# Patient Record
Sex: FEMALE | Race: OTHER | Employment: OTHER | URBAN - METROPOLITAN AREA
[De-identification: names, ages, dates, MRNs, and addresses within clinical notes are randomized per-mention and may not be internally consistent; named-entity substitution may affect disease eponyms.]

---

## 2019-11-11 DIAGNOSIS — M81.0 POSTMENOPAUSAL OSTEOPOROSIS: ICD-10-CM

## 2019-12-11 ENCOUNTER — HOSPITAL ENCOUNTER (OUTPATIENT)
Dept: INFUSION THERAPY | Age: 69
Setting detail: INFUSION SERIES
Discharge: HOME | End: 2019-12-11
Payer: MEDICARE

## 2019-12-11 VITALS
DIASTOLIC BLOOD PRESSURE: 75 MMHG | SYSTOLIC BLOOD PRESSURE: 145 MMHG | HEART RATE: 74 BPM | HEIGHT: 63 IN | WEIGHT: 119 LBS | OXYGEN SATURATION: 98 % | RESPIRATION RATE: 16 BRPM | BODY MASS INDEX: 21.09 KG/M2 | TEMPERATURE: 98.1 F

## 2019-12-11 DIAGNOSIS — M81.0 POSTMENOPAUSAL OSTEOPOROSIS: Primary | ICD-10-CM

## 2019-12-11 PROCEDURE — 6360000002 HC RX W HCPCS: Performed by: INTERNAL MEDICINE

## 2019-12-11 PROCEDURE — 96372 THER/PROPH/DIAG INJ SC/IM: CPT

## 2019-12-11 RX ORDER — TRIAMCINOLONE ACETONIDE 1 MG/G
CREAM TOPICAL 2 TIMES DAILY
COMMUNITY

## 2019-12-11 RX ORDER — LOPERAMIDE HYDROCHLORIDE 2 MG/1
2 CAPSULE ORAL 4 TIMES DAILY PRN
COMMUNITY

## 2019-12-11 RX ORDER — MULTIVIT-MIN/IRON/FOLIC ACID/K 18-600-40
CAPSULE ORAL
COMMUNITY

## 2019-12-11 RX ORDER — LOSARTAN POTASSIUM AND HYDROCHLOROTHIAZIDE 12.5; 1 MG/1; MG/1
1 TABLET ORAL DAILY
COMMUNITY

## 2019-12-11 RX ORDER — LEVOTHYROXINE SODIUM 0.07 MG/1
75 TABLET ORAL DAILY
COMMUNITY

## 2019-12-11 RX ORDER — LEFLUNOMIDE 20 MG/1
20 TABLET ORAL DAILY
COMMUNITY

## 2019-12-11 RX ORDER — M-VIT,TX,IRON,MINS/CALC/FOLIC 27MG-0.4MG
1 TABLET ORAL DAILY
COMMUNITY

## 2019-12-11 RX ORDER — PHENOL 1.4 %
1 AEROSOL, SPRAY (ML) MUCOUS MEMBRANE DAILY
COMMUNITY

## 2019-12-11 RX ORDER — COVID-19 ANTIGEN TEST
KIT MISCELLANEOUS
COMMUNITY

## 2019-12-11 RX ORDER — METOPROLOL SUCCINATE 25 MG/1
25 TABLET, EXTENDED RELEASE ORAL DAILY
COMMUNITY

## 2019-12-11 RX ADMIN — DENOSUMAB 60 MG: 60 INJECTION SUBCUTANEOUS at 11:52

## 2020-06-09 ENCOUNTER — HOSPITAL ENCOUNTER (OUTPATIENT)
Dept: INFUSION THERAPY | Age: 70
Setting detail: INFUSION SERIES
Discharge: HOME | End: 2020-06-09
Payer: MEDICARE

## 2020-06-09 VITALS
DIASTOLIC BLOOD PRESSURE: 74 MMHG | HEART RATE: 76 BPM | TEMPERATURE: 98.2 F | OXYGEN SATURATION: 98 % | SYSTOLIC BLOOD PRESSURE: 145 MMHG | RESPIRATION RATE: 21 BRPM

## 2020-06-09 DIAGNOSIS — M81.0 POSTMENOPAUSAL OSTEOPOROSIS: Primary | ICD-10-CM

## 2020-06-09 PROCEDURE — 6360000002 HC RX W HCPCS: Performed by: INTERNAL MEDICINE

## 2020-06-09 PROCEDURE — 96372 THER/PROPH/DIAG INJ SC/IM: CPT

## 2020-06-09 RX ADMIN — DENOSUMAB 60 MG: 60 INJECTION SUBCUTANEOUS at 11:39

## 2020-11-06 ENCOUNTER — HOSPITAL ENCOUNTER (OUTPATIENT)
Age: 70
Discharge: HOME | End: 2020-11-06
Payer: MEDICARE

## 2020-11-06 LAB
ALBUMIN SERPL-MCNC: 3.7 G/DL (ref 3.5–5.2)
ALP BLD-CCNC: 103 U/L (ref 35–104)
ALT SERPL-CCNC: 20 U/L (ref 0–32)
AST SERPL-CCNC: 29 U/L (ref 0–31)
BASOPHILS ABSOLUTE: 0.05 E9/L (ref 0–0.2)
BASOPHILS RELATIVE PERCENT: 1.1 % (ref 0–2)
BILIRUB SERPL-MCNC: <0.2 MG/DL (ref 0–1.2)
BILIRUBIN DIRECT: <0.2 MG/DL (ref 0–0.3)
BILIRUBIN, INDIRECT: NORMAL MG/DL (ref 0–1)
BUN BLDV-MCNC: 28 MG/DL (ref 8–23)
C-REACTIVE PROTEIN: 0.2 MG/DL (ref 0–0.4)
CALCIUM SERPL-MCNC: 10.4 MG/DL (ref 8.6–10.2)
CREAT SERPL-MCNC: 0.9 MG/DL (ref 0.5–1)
EOSINOPHILS ABSOLUTE: 0.24 E9/L (ref 0.05–0.5)
EOSINOPHILS RELATIVE PERCENT: 5.5 % (ref 0–6)
FERRITIN: 190 NG/ML
GFR AFRICAN AMERICAN: >60
GFR NON-AFRICAN AMERICAN: >60 ML/MIN/1.73
HCT VFR BLD CALC: 34.2 % (ref 34–48)
HEMOGLOBIN: 10.4 G/DL (ref 11.5–15.5)
IMMATURE GRANULOCYTES #: 0.01 E9/L
IMMATURE GRANULOCYTES %: 0.2 % (ref 0–5)
LYMPHOCYTES ABSOLUTE: 0.94 E9/L (ref 1.5–4)
LYMPHOCYTES RELATIVE PERCENT: 21.6 % (ref 20–42)
MCH RBC QN AUTO: 27.7 PG (ref 26–35)
MCHC RBC AUTO-ENTMCNC: 30.4 % (ref 32–34.5)
MCV RBC AUTO: 91.2 FL (ref 80–99.9)
MONOCYTES ABSOLUTE: 0.61 E9/L (ref 0.1–0.95)
MONOCYTES RELATIVE PERCENT: 14 % (ref 2–12)
NEUTROPHILS ABSOLUTE: 2.5 E9/L (ref 1.8–7.3)
NEUTROPHILS RELATIVE PERCENT: 57.6 % (ref 43–80)
PDW BLD-RTO: 14 FL (ref 11.5–15)
PLATELET # BLD: 185 E9/L (ref 130–450)
PMV BLD AUTO: 11.1 FL (ref 7–12)
RBC # BLD: 3.75 E12/L (ref 3.5–5.5)
TOTAL PROTEIN: 7 G/DL (ref 6.4–8.3)
URIC ACID, SERUM: 3.5 MG/DL (ref 2.4–5.7)
VITAMIN D 25-HYDROXY: 98 NG/ML (ref 30–100)
WBC # BLD: 4.4 E9/L (ref 4.5–11.5)

## 2020-11-06 PROCEDURE — 86140 C-REACTIVE PROTEIN: CPT

## 2020-11-06 PROCEDURE — 80076 HEPATIC FUNCTION PANEL: CPT

## 2020-11-06 PROCEDURE — 84550 ASSAY OF BLOOD/URIC ACID: CPT

## 2020-11-06 PROCEDURE — 82728 ASSAY OF FERRITIN: CPT

## 2020-11-06 PROCEDURE — 84520 ASSAY OF UREA NITROGEN: CPT

## 2020-11-06 PROCEDURE — 82310 ASSAY OF CALCIUM: CPT

## 2020-11-06 PROCEDURE — 82565 ASSAY OF CREATININE: CPT

## 2020-11-06 PROCEDURE — 36415 COLL VENOUS BLD VENIPUNCTURE: CPT

## 2020-11-06 PROCEDURE — 82306 VITAMIN D 25 HYDROXY: CPT

## 2020-11-06 PROCEDURE — 82523 COLLAGEN CROSSLINKS: CPT

## 2020-11-06 PROCEDURE — 85025 COMPLETE CBC W/AUTO DIFF WBC: CPT

## 2020-11-10 LAB
Lab: NORMAL
REPORT: NORMAL
THIS TEST SENT TO: NORMAL

## 2020-12-30 ENCOUNTER — HOSPITAL ENCOUNTER (OUTPATIENT)
Dept: INFUSION THERAPY | Age: 70
Setting detail: INFUSION SERIES
Discharge: HOME | End: 2020-12-30
Payer: MEDICARE

## 2020-12-30 VITALS
DIASTOLIC BLOOD PRESSURE: 69 MMHG | SYSTOLIC BLOOD PRESSURE: 141 MMHG | HEART RATE: 78 BPM | RESPIRATION RATE: 18 BRPM | OXYGEN SATURATION: 99 % | TEMPERATURE: 97.9 F

## 2020-12-30 DIAGNOSIS — M81.0 POSTMENOPAUSAL OSTEOPOROSIS: Primary | ICD-10-CM

## 2020-12-30 PROCEDURE — 96372 THER/PROPH/DIAG INJ SC/IM: CPT

## 2020-12-30 PROCEDURE — 6360000002 HC RX W HCPCS: Performed by: INTERNAL MEDICINE

## 2020-12-30 RX ADMIN — DENOSUMAB 60 MG: 60 INJECTION SUBCUTANEOUS at 10:36

## 2021-06-30 ENCOUNTER — HOSPITAL ENCOUNTER (OUTPATIENT)
Age: 71
Setting detail: SPECIMEN
Discharge: HOME | End: 2021-06-30
Payer: MEDICARE

## 2021-06-30 LAB
ALBUMIN SERPL-MCNC: 3.9 G/DL (ref 3.5–5.2)
ALP BLD-CCNC: 108 U/L (ref 35–104)
ALT SERPL-CCNC: 25 U/L (ref 0–32)
AST SERPL-CCNC: 32 U/L (ref 0–31)
BASOPHILS ABSOLUTE: 0.04 E9/L (ref 0–0.2)
BASOPHILS RELATIVE PERCENT: 1 % (ref 0–2)
BILIRUB SERPL-MCNC: 0.3 MG/DL (ref 0–1.2)
BILIRUBIN DIRECT: <0.2 MG/DL (ref 0–0.3)
BILIRUBIN, INDIRECT: ABNORMAL MG/DL (ref 0–1)
BUN BLDV-MCNC: 25 MG/DL (ref 6–23)
C-REACTIVE PROTEIN: 0.3 MG/DL (ref 0–0.4)
CALCIUM SERPL-MCNC: 10 MG/DL (ref 8.6–10.2)
CREAT SERPL-MCNC: 1.1 MG/DL (ref 0.5–1)
EOSINOPHILS ABSOLUTE: 0.15 E9/L (ref 0.05–0.5)
EOSINOPHILS RELATIVE PERCENT: 3.6 % (ref 0–6)
GFR AFRICAN AMERICAN: 59
GFR NON-AFRICAN AMERICAN: 49 ML/MIN/1.73
HCT VFR BLD CALC: 34.3 % (ref 34–48)
HEMOGLOBIN: 10.6 G/DL (ref 11.5–15.5)
IMMATURE GRANULOCYTES #: 0 E9/L
IMMATURE GRANULOCYTES %: 0 % (ref 0–5)
LYMPHOCYTES ABSOLUTE: 0.74 E9/L (ref 1.5–4)
LYMPHOCYTES RELATIVE PERCENT: 17.7 % (ref 20–42)
MCH RBC QN AUTO: 28.4 PG (ref 26–35)
MCHC RBC AUTO-ENTMCNC: 30.9 % (ref 32–34.5)
MCV RBC AUTO: 92 FL (ref 80–99.9)
MONOCYTES ABSOLUTE: 0.58 E9/L (ref 0.1–0.95)
MONOCYTES RELATIVE PERCENT: 13.9 % (ref 2–12)
NEUTROPHILS ABSOLUTE: 2.66 E9/L (ref 1.8–7.3)
NEUTROPHILS RELATIVE PERCENT: 63.8 % (ref 43–80)
PDW BLD-RTO: 14.5 FL (ref 11.5–15)
PLATELET # BLD: 175 E9/L (ref 130–450)
PMV BLD AUTO: 11.5 FL (ref 7–12)
RBC # BLD: 3.73 E12/L (ref 3.5–5.5)
SEDIMENTATION RATE, ERYTHROCYTE: 15 MM/HR (ref 0–20)
TOTAL PROTEIN: 6.8 G/DL (ref 6.4–8.3)
URIC ACID, SERUM: 3.7 MG/DL (ref 2.4–5.7)
VITAMIN D 25-HYDROXY: 75 NG/ML (ref 30–100)
WBC # BLD: 4.2 E9/L (ref 4.5–11.5)

## 2021-06-30 PROCEDURE — 85651 RBC SED RATE NONAUTOMATED: CPT

## 2021-06-30 PROCEDURE — 82306 VITAMIN D 25 HYDROXY: CPT

## 2021-06-30 PROCEDURE — 83937 ASSAY OF OSTEOCALCIN: CPT

## 2021-06-30 PROCEDURE — 84075 ASSAY ALKALINE PHOSPHATASE: CPT

## 2021-06-30 PROCEDURE — 36415 COLL VENOUS BLD VENIPUNCTURE: CPT

## 2021-06-30 PROCEDURE — 84550 ASSAY OF BLOOD/URIC ACID: CPT

## 2021-06-30 PROCEDURE — 85025 COMPLETE CBC W/AUTO DIFF WBC: CPT

## 2021-06-30 PROCEDURE — 82310 ASSAY OF CALCIUM: CPT

## 2021-06-30 PROCEDURE — 80076 HEPATIC FUNCTION PANEL: CPT

## 2021-06-30 PROCEDURE — 84520 ASSAY OF UREA NITROGEN: CPT

## 2021-06-30 PROCEDURE — 82523 COLLAGEN CROSSLINKS: CPT

## 2021-06-30 PROCEDURE — 82565 ASSAY OF CREATININE: CPT

## 2021-06-30 PROCEDURE — 86140 C-REACTIVE PROTEIN: CPT

## 2021-06-30 PROCEDURE — 84080 ASSAY ALKALINE PHOSPHATASES: CPT

## 2021-07-03 LAB
CREATININE URINE: 116 MG/DL
N-TELOPEPTIDE, CROSS-LINKED, URINE: 14

## 2021-07-04 LAB
ALK PHOS OTHER CALC: 0 U/L
ALK PHOSPHATASE: 106 U/L (ref 40–120)
ALKALINE PHOSPHATASE BONE FRACTION: 34 U/L (ref 0–55)
ALKALINE PHOSPHATASE LIVER FRACTION: 72 U/L (ref 0–94)
MISCELLANEOUS LAB TEST RESULT: NORMAL

## 2021-07-14 ENCOUNTER — HOSPITAL ENCOUNTER (OUTPATIENT)
Dept: INFUSION THERAPY | Age: 71
Setting detail: INFUSION SERIES
Discharge: HOME | End: 2021-07-14
Payer: MEDICARE

## 2021-07-14 VITALS
DIASTOLIC BLOOD PRESSURE: 81 MMHG | HEART RATE: 75 BPM | OXYGEN SATURATION: 98 % | SYSTOLIC BLOOD PRESSURE: 126 MMHG | TEMPERATURE: 97.7 F | RESPIRATION RATE: 18 BRPM

## 2021-07-14 DIAGNOSIS — M81.0 POSTMENOPAUSAL OSTEOPOROSIS: Primary | ICD-10-CM

## 2021-07-14 PROCEDURE — 96372 THER/PROPH/DIAG INJ SC/IM: CPT

## 2021-07-14 PROCEDURE — 6360000002 HC RX W HCPCS: Performed by: INTERNAL MEDICINE

## 2021-07-14 RX ADMIN — DENOSUMAB 60 MG: 60 INJECTION SUBCUTANEOUS at 10:34

## 2022-01-12 ENCOUNTER — HOSPITAL ENCOUNTER (OUTPATIENT)
Dept: INFUSION THERAPY | Age: 72
Setting detail: INFUSION SERIES
Discharge: HOME | End: 2022-01-12
Payer: MEDICARE

## 2022-01-12 VITALS
HEART RATE: 74 BPM | SYSTOLIC BLOOD PRESSURE: 116 MMHG | RESPIRATION RATE: 18 BRPM | TEMPERATURE: 97.9 F | OXYGEN SATURATION: 98 % | DIASTOLIC BLOOD PRESSURE: 64 MMHG

## 2022-01-12 DIAGNOSIS — M81.0 POSTMENOPAUSAL OSTEOPOROSIS: Primary | ICD-10-CM

## 2022-01-12 PROCEDURE — 6360000002 HC RX W HCPCS: Performed by: INTERNAL MEDICINE

## 2022-01-12 PROCEDURE — 96372 THER/PROPH/DIAG INJ SC/IM: CPT

## 2022-01-12 RX ADMIN — DENOSUMAB 60 MG: 60 INJECTION SUBCUTANEOUS at 10:34

## 2022-07-13 ENCOUNTER — HOSPITAL ENCOUNTER (OUTPATIENT)
Dept: INFUSION THERAPY | Age: 72
Setting detail: INFUSION SERIES
Discharge: HOME | End: 2022-07-13
Payer: MEDICARE

## 2022-07-13 VITALS
TEMPERATURE: 97.2 F | HEART RATE: 59 BPM | SYSTOLIC BLOOD PRESSURE: 147 MMHG | RESPIRATION RATE: 20 BRPM | OXYGEN SATURATION: 99 % | DIASTOLIC BLOOD PRESSURE: 65 MMHG

## 2022-07-13 DIAGNOSIS — M81.0 POSTMENOPAUSAL OSTEOPOROSIS: Primary | ICD-10-CM

## 2022-07-13 PROCEDURE — 96372 THER/PROPH/DIAG INJ SC/IM: CPT

## 2022-07-13 PROCEDURE — 6360000002 HC RX W HCPCS: Performed by: INTERNAL MEDICINE

## 2022-07-13 RX ADMIN — DENOSUMAB 60 MG: 60 INJECTION SUBCUTANEOUS at 10:08

## 2022-12-13 DIAGNOSIS — M81.0 POSTMENOPAUSAL OSTEOPOROSIS: Primary | ICD-10-CM

## 2023-05-11 LAB
ANION GAP SERPL CALCULATED.3IONS-SCNC: 8 MMOL/L (ref 7–16)
BUN SERPL-MCNC: 23 MG/DL (ref 6–23)
CALCIUM SERPL-MCNC: 10 MG/DL (ref 8.6–10.2)
CHLORIDE SERPL-SCNC: 102 MMOL/L (ref 98–107)
CO2 SERPL-SCNC: 29 MMOL/L (ref 22–29)
CREAT SERPL-MCNC: 0.9 MG/DL (ref 0.5–1)
GLUCOSE SERPL-MCNC: 102 MG/DL (ref 74–99)
MAGNESIUM SERPL-MCNC: 2 MG/DL (ref 1.6–2.6)
POTASSIUM SERPL-SCNC: 4.6 MMOL/L (ref 3.5–5)
SODIUM SERPL-SCNC: 139 MMOL/L (ref 132–146)

## 2023-08-01 LAB
ANION GAP SERPL CALCULATED.3IONS-SCNC: 11 MMOL/L (ref 7–16)
BUN SERPL-MCNC: 24 MG/DL (ref 6–23)
CALCIUM SERPL-MCNC: 10.2 MG/DL (ref 8.6–10.2)
CHLORIDE SERPL-SCNC: 102 MMOL/L (ref 98–107)
CO2 SERPL-SCNC: 27 MMOL/L (ref 22–29)
CREAT SERPL-MCNC: 1.1 MG/DL (ref 0.5–1)
GFR SERPL CREATININE-BSD FRML MDRD: 52 ML/MIN/1.73M2
GLUCOSE SERPL-MCNC: 86 MG/DL (ref 74–99)
MAGNESIUM SERPL-MCNC: 2 MG/DL (ref 1.6–2.6)
POTASSIUM SERPL-SCNC: 5 MMOL/L (ref 3.5–5)
SODIUM SERPL-SCNC: 140 MMOL/L (ref 132–146)

## 2023-12-07 DIAGNOSIS — M81.0 POSTMENOPAUSAL OSTEOPOROSIS: Primary | ICD-10-CM

## 2024-01-11 ENCOUNTER — HOSPITAL ENCOUNTER (OUTPATIENT)
Dept: INFUSION THERAPY | Age: 74
Setting detail: INFUSION SERIES
Discharge: HOME | End: 2024-01-11
Payer: MEDICARE

## 2024-01-11 VITALS
HEART RATE: 67 BPM | OXYGEN SATURATION: 97 % | SYSTOLIC BLOOD PRESSURE: 158 MMHG | TEMPERATURE: 97 F | RESPIRATION RATE: 18 BRPM | DIASTOLIC BLOOD PRESSURE: 68 MMHG

## 2024-01-11 DIAGNOSIS — M81.0 POSTMENOPAUSAL OSTEOPOROSIS: Primary | ICD-10-CM

## 2024-01-11 PROCEDURE — 6360000002 HC RX W HCPCS: Performed by: INTERNAL MEDICINE

## 2024-01-11 PROCEDURE — 96372 THER/PROPH/DIAG INJ SC/IM: CPT

## 2024-01-11 RX ADMIN — DENOSUMAB 60 MG: 60 INJECTION SUBCUTANEOUS at 09:56

## 2024-05-02 ENCOUNTER — EVALUATION (OUTPATIENT)
Dept: PHYSICAL THERAPY | Facility: HOSPITAL | Age: 74
End: 2024-05-02
Payer: MEDICARE

## 2024-05-02 DIAGNOSIS — M41.56 OTHER SECONDARY SCOLIOSIS, LUMBAR REGION: Primary | ICD-10-CM

## 2024-05-02 DIAGNOSIS — M54.50 SPINE PAIN, LUMBOSACRAL: ICD-10-CM

## 2024-05-02 PROCEDURE — 97162 PT EVAL MOD COMPLEX 30 MIN: CPT | Mod: GP

## 2024-05-02 SDOH — ECONOMIC STABILITY: FOOD INSECURITY: WITHIN THE PAST 12 MONTHS, YOU WORRIED THAT YOUR FOOD WOULD RUN OUT BEFORE YOU GOT MONEY TO BUY MORE.: NEVER TRUE

## 2024-05-02 SDOH — ECONOMIC STABILITY: FOOD INSECURITY: WITHIN THE PAST 12 MONTHS, THE FOOD YOU BOUGHT JUST DIDN'T LAST AND YOU DIDN'T HAVE MONEY TO GET MORE.: NEVER TRUE

## 2024-05-02 ASSESSMENT — PAIN - FUNCTIONAL ASSESSMENT: PAIN_FUNCTIONAL_ASSESSMENT: 0-10

## 2024-05-02 ASSESSMENT — PAIN DESCRIPTION - DESCRIPTORS: DESCRIPTORS: ACHING;SHARP;SHOOTING;NUMBNESS

## 2024-05-02 ASSESSMENT — ENCOUNTER SYMPTOMS
LOSS OF SENSATION IN FEET: 0
DEPRESSION: 0

## 2024-05-02 ASSESSMENT — PATIENT HEALTH QUESTIONNAIRE - PHQ9
SUM OF ALL RESPONSES TO PHQ9 QUESTIONS 1 AND 2: 0
2. FEELING DOWN, DEPRESSED OR HOPELESS: NOT AT ALL
1. LITTLE INTEREST OR PLEASURE IN DOING THINGS: NOT AT ALL

## 2024-05-02 ASSESSMENT — PAIN SCALES - GENERAL: PAINLEVEL_OUTOF10: 0 - NO PAIN

## 2024-05-02 NOTE — PROGRESS NOTES
Physical Therapy Evaluation    Patient Name: Alexa Jerry  MRN: [id number]  : 1950   Today's Date: 2024    Time Calculation  Start Time: 1119  Stop Time: 1205  Time Calculation (min): 46 min  PT Evaluation Time Entry  PT Evaluation (Moderate) Time Entry: 46            Current Problem  1. Other secondary scoliosis, lumbar region  Referral to Physical Therapy    Follow Up In Physical Therapy      2. Spine pain, lumbosacral  Follow Up In Physical Therapy          Subjective   General:  General  Reason for Referral: Scoliosis- L-S pain  Referred By: Dr Ragsdale  Past Medical History Relevant to Rehab: Afib, dementia, ELLY,  Preferred Learning Style: kinesthetic, visual, writtenPt fell about 2-3 months when stepping out of the shower, her right side hit the side of the tub, fractured 3 ribs, hospitalized about 4 days,  has pain with movements. When she sits for prolonged period feels numbness int buttocks.    Precautions:    Precautions  STEADI Fall Risk Score (The score of 4 or more indicates an increased risk of falling): 7    Pain:  Pain Assessment: 0-10  Pain Score: 0 - No pain  Pain Type: Chronic pain  Pain Location: Back  Pain Orientation: Lower  Pain Descriptors: Aching, Sharp, Shooting, Numbness  Pain Frequency: IntermittentCurrent: 0/10   Worst: 8/10 gets sharp pains with certain movements        Prior Function Per Pt/Caregiver Report:   Independent with ADL's and ambulation without Assistive Device, didn't sit still, constantly cleaning and moving    Objective      CT THORACIC SPINE: 2023     PARASPINAL SOFT TISSUES: No paravertebral fluid collection or  significant edema.  ALIGNMENT: No traumatic spondylolisthesis or traumatic facet  widening.  VERTEBRAE: No acute fracture. Vertebral body heights are maintained.  SPINAL CANAL/INTERVERTEBRAL DISCS: There are multilevel disc spur  complexes at T3-T4, T10-T11, T11-T12, T12-L1. At T3-T4 this resultant  effacement of ventral thecal sac and mild  canal stenosis. At T10-T11,  this is left paracentral resulting in mild lateral recess stenosis.  At T11-T12, this is diffuse, resulting in mild canal stenosis.  T12-L1, there is a large right paracentral disc spur complex with  inferior migration of a calcified disc fragment causing right lateral  recess stenosis and superimposed upon broad-based disc spur complex  with mild canal stenosis. There is severe disc height loss at  T11-T12, T12-L1.        CT LUMBAR SPINE:     PARASPINAL SOFT TISSUES: No paravertebral fluid collection or  significant edema.  ALIGNMENT: There is degenerative grade 1 retrolisthesis of L2 on L3.  there is a degenerative levoconvex scoliosis centered at L3-L4, L4-5  due to asymmetric disc height loss on the right. No traumatic  spondylolisthesis or traumatic facet widening.  VERTEBRAE: No acute fracture. Vertebral body heights are maintained.  SPINAL CANAL/INTERVERTEBRAL DISCS/NEURAL FORAMINA: There is severe  L1-L2 disc height loss. There is a large calcified central right  paracentral disc spur complex at L1-L2 causing mild-moderate central  canal stenosis and high-grade right lateral recess stenosis, likely  abutting the intraforaminal/proximal extraforaminal right L1 nerve  root. There is otherwise mild degenerative disc disease without  significant canal stenosis or high-grade foraminal stenosis. There  mild right L3-L4, L4-5 foraminal stenosis.     IMPRESSION:  CT CHEST/ABDOMEN/PELVIS:  1. Acute fractures of the posterior right 9th-11th ribs in 2  locations (costovertebral joint and posterolaterally) which meet  criteria for flail chest. There is also acute nondisplaced fracture  of the posterior right 12th rib.  2. No acute traumatic injury in the chest, abdomen, or pelvis.  3. Trace right pleural effusion and mild bibasilar atelectasis.  4. Mildly inflamed appearance of the distal transverse through the  rectosigmoid colon as described above. Please correlate for symptoms  of  colitis.  5. 0.7 cm incompletely characterized hyperdense right renal mass that  could represent an enhancing tumor or hemorrhagic cyst. Recommend  urologic follow-up for appropriate management which may include  multiphasic MRI of the kidneys or repeat cross-sectional imaging in 1  year to re-evaluate..  Posture:   Scoliosis-right hip lower    Range of Motion:     Trunk 5/2    Flexion 25    Extension 10    SB 12 5          Strength:   Measured with seated MMT'ing, WNL's with exception of those listed below    Right Left   Hip:     Flexion 3+/5 P! 3+/5 P! On right   Abduction 4+/5 P! 4+/5      Flexibility:   Hamstring (SLR):  Right =  62      Left =68     Degrees     Palpation:   TTP over right Si and piriformis    Special Tests:   SLR: Neg EBONY    Gait:   Pt ambulates independently without Assistive Device, narrow    Outcome Measures:    Oswestry Disablity Index (JABIER): 32%     Treatment:                            Date: 5/2                           VISIT# #1 #   EXERCISES REPS REPS        NuStep          Hamstring Str          DKTC     LTR                              HEP        OP EDUCATION:  Outpatient Education  Individual(s) Educated: Patient  Education Provided: POC, Fall Risk, Physiology  Risk and Benefits Discussed with Patient/Caregiver/Other: yes  Patient/Caregiver Demonstrated Understanding: yes  Plan of Care Discussed and Agreed Upon: yes  Patient Response to Education: Patient/Caregiver Verbalized Understanding of Information, Patient/Caregiver Asked Appropriate Questions  Assessment   PT Assessment  PT Assessment Results: Decreased strength, Decreased range of motion, Decreased mobility, Pain  Rehab Prognosis: Fair      Plan  Treatment/Interventions: Education/ Instruction, Electrical stimulation, Cryotherapy, Manual therapy, Neuromuscular re-education, Therapeutic activities, Therapeutic exercises, Ultrasound  PT Plan: Skilled PT  PT Frequency: 2 times per week  Duration: 6-8 wks  Rehab Potential:  Good  Plan of Care Agreement: Patient    Goals:  Active       L-S pain       1. Pt will demonstrate independence with HEP to reinforce gains made in treatment  (Met)       Start:  05/02/24    Expected End:  05/29/24    Resolved:  05/29/24         2. Pt will report having a decrease in pain to 6/10 at its worst for increased tolerance for performing ADL's  (Progressing)       Start:  05/02/24    Expected End:  06/07/24            3. Pt will have an increase in core and LE strength by 1/3 MMT grade for decreased difficulty with mobility       Start:  05/02/24    Expected End:  06/27/24            4. Pt will have a decrease in Oswestry Disability score to 28% to indicate an increase in functional ability       Start:  05/02/24    Expected End:  06/27/24            5. Pt will demonstrate having a good understanding an performance of transfers and positioning to reduce back pain/strain       Start:  05/02/24    Expected End:  06/07/24

## 2024-05-02 NOTE — Clinical Note
June 2, 2024    Walt Ragsdale MD  [address] [facility] [place] [address]    Patient: Alexa Jerry   YOB: 1950   Date of Visit: 5/2/2024       Dear Walt Ragsdale MD  [address] [place] [address]    The attached plan of care is being sent to you because your patient’s medical reimbursement requires that you certify the plan of care. Your signature is required to allow uninterrupted insurance coverage.      You may indicate your approval by signing below and faxing this form back to us at Dept Fax: [phone].    Please call Dept: [phone] with any questions or concerns.    Thank you for this referral,        Cathi Franks, PT  [address] [address] [phone]    Payer: Payor: [employer] MEDICARE / Plan: [employer] MEDICARE / Product Type: *No Product type* /                                                                         Date:     Dear Cathi Franks, PT,     Re: Ms. Aelxa Jerry, MRN:[id number]    I certify that I have reviewed the attached plan of care and it is medically necessary for Ms. Alexa Jerry (1950) who is under my care.          ______________________________________                    _________________  Provider name and credentials                                           Date and time                                                                                           Plan of Care 5/2/24   Effective from: 5/2/2024  Effective to: 8/1/2024    Plan ID: [id number]            Participants as of Finalize on 6/2/2024    Name Type Comments Contact Info    Walt Ragsdale MD Referring Provider  [phone]    Cathi Franks, PT Physical Therapist  [phone]       Last Plan Note     Author: Cathi Franks PT Status: Signed Last edited:  2024  9:00 AM         Physical Therapy Evaluation    Patient Name: Alexa Jerry  MRN: [id number]  : 1950   Today's Date: 2024    Time Calculation  Start Time: 0903 (In bathroom)  Stop Time: 0945  Time Calculation (min): 42 min     PT Therapeutic Procedures Time Entry  Manual Therapy Time Entry: 5  Therapeutic Exercise Time Entry: 24  Therapeutic Activity Time Entry: 3  PT Modalities Time Entry  Ultrasound Time Entry: 10      Current Problem  1. Other secondary scoliosis, lumbar region  Follow Up In Physical Therapy      2. Spine pain, lumbosacral  Follow Up In Physical Therapy          Subjective   General:   Pt fell about 2-3 months when stepping out of the shower, her right side hit the side of the tub, fractured 3 ribs, hospitalized about 4 days,  has pain with movements. When she sits for prolonged period feels numbness int buttocks.    Precautions:    Precautions  STEADI Fall Risk Score (The score of 4 or more indicates an increased risk of falling): 7    Pain:  Pain Assessment: 0-10  Pain Score: 0 - No pain  Pain Type: Chronic pain  Pain Location: Back  Pain Orientation: LowerCurrent: 0/10   Worst: 8/10 gets sharp pains with certain movements        Prior Function Per Pt/Caregiver Report:   Independent with ADL's and ambulation without Assistive Device, didn't sit still, constantly cleaning and moving    Objective      CT THORACIC SPINE: 2023     PARASPINAL SOFT TISSUES: No paravertebral fluid collection or  significant edema.  ALIGNMENT: No traumatic spondylolisthesis or traumatic facet  widening.  VERTEBRAE: No acute fracture. Vertebral body heights are maintained.  SPINAL CANAL/INTERVERTEBRAL DISCS: There are multilevel disc spur  complexes at T3-T4, T10-T11, T11-T12, T12-L1. At T3-T4 this resultant  effacement of ventral thecal sac and mild canal stenosis. At T10-T11,  this is left paracentral resulting in mild lateral recess stenosis.  At T11-T12, this is diffuse, resulting in mild  canal stenosis.  T12-L1, there is a large right paracentral disc spur complex with  inferior migration of a calcified disc fragment causing right lateral  recess stenosis and superimposed upon broad-based disc spur complex  with mild canal stenosis. There is severe disc height loss at  T11-T12, T12-L1.        CT LUMBAR SPINE:     PARASPINAL SOFT TISSUES: No paravertebral fluid collection or  significant edema.  ALIGNMENT: There is degenerative grade 1 retrolisthesis of L2 on L3.  there is a degenerative levoconvex scoliosis centered at L3-L4, L4-5  due to asymmetric disc height loss on the right. No traumatic  spondylolisthesis or traumatic facet widening.  VERTEBRAE: No acute fracture. Vertebral body heights are maintained.  SPINAL CANAL/INTERVERTEBRAL DISCS/NEURAL FORAMINA: There is severe  L1-L2 disc height loss. There is a large calcified central right  paracentral disc spur complex at L1-L2 causing mild-moderate central  canal stenosis and high-grade right lateral recess stenosis, likely  abutting the intraforaminal/proximal extraforaminal right L1 nerve  root. There is otherwise mild degenerative disc disease without  significant canal stenosis or high-grade foraminal stenosis. There  mild right L3-L4, L4-5 foraminal stenosis.     IMPRESSION:  CT CHEST/ABDOMEN/PELVIS:  1. Acute fractures of the posterior right 9th-11th ribs in 2  locations (costovertebral joint and posterolaterally) which meet  criteria for flail chest. There is also acute nondisplaced fracture  of the posterior right 12th rib.  2. No acute traumatic injury in the chest, abdomen, or pelvis.  3. Trace right pleural effusion and mild bibasilar atelectasis.  4. Mildly inflamed appearance of the distal transverse through the  rectosigmoid colon as described above. Please correlate for symptoms  of colitis.  5. 0.7 cm incompletely characterized hyperdense right renal mass that  could represent an enhancing tumor or hemorrhagic cyst.  Recommend  urologic follow-up for appropriate management which may include  multiphasic MRI of the kidneys or repeat cross-sectional imaging in 1  year to re-evaluate..  Posture:   Scoliosis-right hip lower    Range of Motion:     Trunk 5/2    Flexion 25    Extension 10    SB 12 5          Strength:   Measured with seated MMT'ing, WNL's with exception of those listed below    Right Left   Hip:     Flexion 3+/5 P! 3+/5 P! On right   Abduction 4+/5 P! 4+/5      Flexibility:   Hamstring (SLR):  Right =  62      Left =68     Degrees     Palpation:   TTP over right Si and piriformis    Special Tests:   SLR: Neg EBONY    Gait:   Pt ambulates independently without Assistive Device, narrow    Outcome Measures:          Treatment:                            Date: 5/2                           VISIT# #1 #   EXERCISES REPS REPS        NuStep          Hamstring Str          DKTC     LTR                              HEP        OP EDUCATION:     Assessment          Plan       Goals:  Active       L-S pain       1. Pt will demonstrate independence with HEP to reinforce gains made in treatment  (Met)       Start:  05/02/24    Expected End:  05/29/24    Resolved:  05/29/24         2. Pt will report having a decrease in pain to 6/10 at its worst for increased tolerance for performing ADL's  (Progressing)       Start:  05/02/24    Expected End:  06/07/24            3. Pt will have an increase in core and LE strength by 1/3 MMT grade for decreased difficulty with mobility       Start:  05/02/24    Expected End:  06/27/24            4. Pt will have a decrease in Oswestry Disability score to 28% to indicate an increase in functional ability       Start:  05/02/24    Expected End:  06/27/24            5. Pt will demonstrate having a good understanding an performance of transfers and positioning to reduce back pain/strain       Start:  05/02/24    Expected End:  06/07/24              Physical Therapy    Physical Therapy Treatment    Patient  "Name: Alexa Jerry  MRN: [id number]  : 1950   Today's Date: 2024  Time Calculation  Start Time: 903 (In bathroom)  Stop Time: 945  Time Calculation (min): 42 min  PT Therapeutic Procedures Time Entry  Manual Therapy Time Entry: 5  Therapeutic Exercise Time Entry: 24  Therapeutic Activity Time Entry: 3  PT Modalities Time Entry  Ultrasound Time Entry: 10  Visit # 7  Insurance:          Current Problem  1. Other secondary scoliosis, lumbar region  Follow Up In Physical Therapy      2. Spine pain, lumbosacral  Follow Up In Physical Therapy            Subjective   Pt reports no pain upon arrival, continues to be worst in a.m and with certain movements       Precautions  Precautions  STEADI Fall Risk Score (The score of 4 or more indicates an increased risk of falling): 7       Pain  Pain Assessment: 0-10  Pain Score: 0 - No pain  Pain Type: Chronic pain  Pain Location: Back  Pain Orientation: Lower    Objective            Treatments:                            Date:                           VISIT# 6 7   EXERCISES          NuStep L1 x13' L1 x 10'        RB Calf Str 3 x 30\"    Hip Flexor Str 3 x 30\" ea 3 x30   T- Band: with core stabilization      Mid Row 2x 10 x rtb     Pull Down 2 x15 x ytb     Chop  2 x 10 x ytb        DKTC on SB 2 x10 2 x 10    LTR on SB  2 x 10     Lateral  2 laps    Hip abduction     Ball squeeze          Seated Trunk flex on SB          US 1.0 MHz 1.5 W/cm2 x 8' to right SI- piriformis region 1.0 MHz 1.5 W/cm2 x 8' to right SI- piriformis region   IFC w/ MH     Manual:  -distraction   HEP       Assessment:         Plan:       OP EDUCATION:       Goals:  Active       L-S pain       1. Pt will demonstrate independence with HEP to reinforce gains made in treatment  (Met)       Start:  24    Expected End:  24    Resolved:  24         2. Pt will report having a decrease in pain to 6/10 at its worst for increased tolerance for performing ADL's  (Progressing)  "      Start:  05/02/24    Expected End:  06/07/24            3. Pt will have an increase in core and LE strength by 1/3 MMT grade for decreased difficulty with mobility       Start:  05/02/24    Expected End:  06/27/24            4. Pt will have a decrease in Oswestry Disability score to 28% to indicate an increase in functional ability       Start:  05/02/24    Expected End:  06/27/24            5. Pt will demonstrate having a good understanding an performance of transfers and positioning to reduce back pain/strain       Start:  05/02/24    Expected End:  06/07/24                 .         Current Participants as of 6/2/2024    Name Type Comments Contact Info    Walt Ragsdale MD Referring Provider  [phone]    Signature pending    Cathi Franks PT Physical Therapist  [phone]

## 2024-05-15 ENCOUNTER — TREATMENT (OUTPATIENT)
Dept: PHYSICAL THERAPY | Facility: HOSPITAL | Age: 74
End: 2024-05-15
Payer: MEDICARE

## 2024-05-15 DIAGNOSIS — M41.56 OTHER SECONDARY SCOLIOSIS, LUMBAR REGION: ICD-10-CM

## 2024-05-15 DIAGNOSIS — M54.50 SPINE PAIN, LUMBOSACRAL: ICD-10-CM

## 2024-05-15 PROCEDURE — 97110 THERAPEUTIC EXERCISES: CPT | Mod: GP

## 2024-05-15 ASSESSMENT — PAIN SCALES - GENERAL: PAINLEVEL_OUTOF10: 0 - NO PAIN

## 2024-05-15 ASSESSMENT — PAIN - FUNCTIONAL ASSESSMENT: PAIN_FUNCTIONAL_ASSESSMENT: 0-10

## 2024-05-15 NOTE — PROGRESS NOTES
Physical Therapy    Physical Therapy Treatment    Patient Name: Alexa Jerry  MRN: [id number]  : 1950   Today's Date: 5/15/2024  Time Calculation  Start Time: 953 (Therapist error)  Stop Time: 0  Time Calculation (min): 47 min  PT Therapeutic Procedures Time Entry  Therapeutic Exercise Time Entry: 43     Visit # 2  Insurance:          Current Problem  1. Other secondary scoliosis, lumbar region  Follow Up In Physical Therapy      2. Spine pain, lumbosacral  Follow Up In Physical Therapy            Subjective   Pt reports no new complaints, pain continues to be with rotational movements, worse with getting out of bed in the a.m       Precautions  Precautions  STEADI Fall Risk Score (The score of 4 or more indicates an increased risk of falling): 7       Pain  Pain Assessment: 0-10  Pain Score: 0 - No pain  Pain Type: Chronic pain  Pain Location: Back  Pain Orientation: Lower  Pain Frequency: Intermittent    Objective    Additional objective measures added to Initial evaluation      Treatments:                            Date: 5/2 5/15                          VISIT# #1 #2   EXERCISES REPS REPS     - continuation of LB assesssment   NuStep  L1 x 10'        T- Band      Mid Row      Pull Down      Chop          DKTC on SB  2 x10   LTR on SB  2 x10         Hip abduction  3  x 10 x ytb   Ball squeeze  X 10         HEP       Assessment:  Pt tolerated treatment without complaint of residual increase in symptoms, beginning to  progress with POC        Plan:   Progress with pain management and core strengthening and mobility as tolerated    OP EDUCATION:   Print out for HEP issued with written instruction and pictures for :  LTR  SKTC  Hip abd  Ball Squeeze  Goals:  Active       L-S pain       1. Pt will demonstrate independence with HEP to reinforce gains made in treatment  (Met)       Start:  24    Expected End:  24    Resolved:  24         2. Pt will report having a decrease in pain to 6/10  at its worst for increased tolerance for performing ADL's  (Progressing)       Start:  05/02/24    Expected End:  06/07/24            3. Pt will have an increase in core and LE strength by 1/3 MMT grade for decreased difficulty with mobility       Start:  05/02/24    Expected End:  06/27/24            4. Pt will have a decrease in Oswestry Disability score to 28% to indicate an increase in functional ability       Start:  05/02/24    Expected End:  06/27/24            5. Pt will demonstrate having a good understanding an performance of transfers and positioning to reduce back pain/strain       Start:  05/02/24    Expected End:  06/07/24                 .

## 2024-05-17 ENCOUNTER — TREATMENT (OUTPATIENT)
Dept: PHYSICAL THERAPY | Facility: HOSPITAL | Age: 74
End: 2024-05-17
Payer: MEDICARE

## 2024-05-17 DIAGNOSIS — M41.56 OTHER SECONDARY SCOLIOSIS, LUMBAR REGION: ICD-10-CM

## 2024-05-17 DIAGNOSIS — M54.50 SPINE PAIN, LUMBOSACRAL: ICD-10-CM

## 2024-05-17 PROCEDURE — 97110 THERAPEUTIC EXERCISES: CPT | Mod: GP

## 2024-05-17 PROCEDURE — 97014 ELECTRIC STIMULATION THERAPY: CPT | Mod: GP

## 2024-05-17 PROCEDURE — 97140 MANUAL THERAPY 1/> REGIONS: CPT | Mod: GP

## 2024-05-17 ASSESSMENT — PAIN SCALES - GENERAL: PAINLEVEL_OUTOF10: 0 - NO PAIN

## 2024-05-17 ASSESSMENT — PAIN - FUNCTIONAL ASSESSMENT: PAIN_FUNCTIONAL_ASSESSMENT: 0-10

## 2024-05-17 NOTE — PROGRESS NOTES
Physical Therapy    Physical Therapy Treatment    Patient Name: Alexa Jerry  MRN: [id number]  : 1950   Today's Date: 2024  Time Calculation  Start Time: 1021  Stop Time: 1110  Time Calculation (min): 49 min  PT Therapeutic Procedures Time Entry  Manual Therapy Time Entry: 8  Therapeutic Exercise Time Entry: 21  PT Modalities Time Entry  E-Stim (Unattended) Time Entry: 15  Visit # 3  Insurance:          Current Problem  1. Other secondary scoliosis, lumbar region  Follow Up In Physical Therapy      2. Spine pain, lumbosacral  Follow Up In Physical Therapy            Subjective   Pt reports no new complaints, denies falling since last visit.        Precautions  Precautions  STEADI Fall Risk Score (The score of 4 or more indicates an increased risk of falling): 7       Pain  Pain Assessment: 0-10  Pain Score: 0 - No pain  Pain Type: Chronic pain  Pain Location: Back  Pain Orientation: Lower    Objective      Initiation of resisted pull down and mid row while focusing on maintaining core stabilization      Treatments:                              Date: 5/2 5/15 5/17                          VISIT# #1 #2 3   EXERCISES REPS REPS      - continuation of LB assesssment    NuStep  L1 x 10' Lx 8'         T- Band       Mid Row   2 x 10 x ytb    Pull Down   2 x 10 x ytb      Chop   Next   T-Band: with core stabilization      DKTC on SB  2 x10 2 x 10   LTR on SB  2 x10  2 x 10         Hip abduction  3  x 10 x ytb    Ball squeeze  X 10           IFC w/ MH   x15   Manual:   LE distraction   HEP   X-review + new     Assessment:  Pt tolerated treatment without complaint of residual increase in symptoms, progressing with core strengthening      Plan:   Progress with core strengthening and pain management    OP EDUCATION:   HEP printout with written instruction and pictures issue for   Resisted Mid Row and Pull downs with t-band    Goals:  Active       L-S pain       1. Pt will demonstrate independence with HEP to  reinforce gains made in treatment  (Met)       Start:  05/02/24    Expected End:  05/29/24    Resolved:  05/29/24         2. Pt will report having a decrease in pain to 6/10 at its worst for increased tolerance for performing ADL's  (Progressing)       Start:  05/02/24    Expected End:  06/07/24            3. Pt will have an increase in core and LE strength by 1/3 MMT grade for decreased difficulty with mobility       Start:  05/02/24    Expected End:  06/27/24            4. Pt will have a decrease in Oswestry Disability score to 28% to indicate an increase in functional ability       Start:  05/02/24    Expected End:  06/27/24            5. Pt will demonstrate having a good understanding an performance of transfers and positioning to reduce back pain/strain       Start:  05/02/24    Expected End:  06/07/24                 .

## 2024-05-21 ENCOUNTER — TREATMENT (OUTPATIENT)
Dept: PHYSICAL THERAPY | Facility: HOSPITAL | Age: 74
End: 2024-05-21
Payer: MEDICARE

## 2024-05-21 DIAGNOSIS — M54.50 SPINE PAIN, LUMBOSACRAL: ICD-10-CM

## 2024-05-21 DIAGNOSIS — M41.56 OTHER SECONDARY SCOLIOSIS, LUMBAR REGION: ICD-10-CM

## 2024-05-21 PROCEDURE — 97014 ELECTRIC STIMULATION THERAPY: CPT | Mod: GP

## 2024-05-21 PROCEDURE — 97113 AQUATIC THERAPY/EXERCISES: CPT | Mod: GP

## 2024-05-21 PROCEDURE — 97140 MANUAL THERAPY 1/> REGIONS: CPT | Mod: GP

## 2024-05-21 ASSESSMENT — PAIN - FUNCTIONAL ASSESSMENT: PAIN_FUNCTIONAL_ASSESSMENT: 0-10

## 2024-05-21 ASSESSMENT — PAIN DESCRIPTION - DESCRIPTORS: DESCRIPTORS: ACHING;SHARP

## 2024-05-21 ASSESSMENT — PAIN SCALES - GENERAL: PAINLEVEL_OUTOF10: 0 - NO PAIN

## 2024-05-21 NOTE — PROGRESS NOTES
Physical Therapy    Physical Therapy Treatment    Patient Name: Alexa Jerry  MRN: [id number]  : 1950   Today's Date: 2024  Time Calculation  Start Time: 1100  Stop Time: 1155  Time Calculation (min): 55 min  PT Therapeutic Procedures Time Entry  Manual Therapy Time Entry: 8  Therapeutic Exercise Time Entry: 32  PT Modalities Time Entry  E-Stim (Unattended) Time Entry: 15  Visit # 4  Insurance:          Current Problem  1. Other secondary scoliosis, lumbar region  Follow Up In Physical Therapy      2. Spine pain, lumbosacral  Follow Up In Physical Therapy            Subjective   Pt reports no new complaints, denies falling since last visit.          Precautions  Precautions  STEADI Fall Risk Score (The score of 4 or more indicates an increased risk of falling): 7       Pain  Pain Assessment: 0-10  Pain Score: 0 - No pain  Pain Type: Chronic pain  Pain Location: Back  Pain Orientation: Lower  Pain Descriptors: Aching, Sharp  Pain Frequency: Intermittent    Objective            Treatments:                            Date: 5/2 5/15 5/17 5/21                            VISIT# #1 #2 3 4   EXERCISES REPS REPS       - continuation of LB assesssment     NuStep  L1 x 10' Lx 8' L1 x 12'          T- Band        Mid Row   2 x 10 x ytb     Pull Down   2 x 10 x ytb       Chop   Next    T-Band: with core stabilization       DKTC on SB  2 x10 2 x 10    LTR on SB  2 x10  2 x 10           Hip abduction  3  x 10 x ytb     Ball squeeze  X 10   2x10          Seated Trunk flex on SB    x10   Transverse Ab set    2 x 10          IFC w/ MH   x15 x15   Manual:   LE distraction    HEP   X-review + new      Assessment:  Pt tolerated treatment without complaint of increase in symptoms, responding to treatment with increased understanding of core stabilization, progressing with core strengthening        Plan:   Progress with core strengthening    OP EDUCATION:       Goals:  Active       L-S pain       1. Pt will demonstrate  independence with HEP to reinforce gains made in treatment  (Met)       Start:  05/02/24    Expected End:  05/29/24    Resolved:  05/29/24         2. Pt will report having a decrease in pain to 6/10 at its worst for increased tolerance for performing ADL's  (Progressing)       Start:  05/02/24    Expected End:  06/07/24            3. Pt will have an increase in core and LE strength by 1/3 MMT grade for decreased difficulty with mobility       Start:  05/02/24    Expected End:  06/27/24            4. Pt will have a decrease in Oswestry Disability score to 28% to indicate an increase in functional ability       Start:  05/02/24    Expected End:  06/27/24            5. Pt will demonstrate having a good understanding an performance of transfers and positioning to reduce back pain/strain       Start:  05/02/24    Expected End:  06/07/24                 .

## 2024-05-23 ENCOUNTER — TREATMENT (OUTPATIENT)
Dept: PHYSICAL THERAPY | Facility: HOSPITAL | Age: 74
End: 2024-05-23
Payer: MEDICARE

## 2024-05-23 DIAGNOSIS — M41.56 OTHER SECONDARY SCOLIOSIS, LUMBAR REGION: ICD-10-CM

## 2024-05-23 DIAGNOSIS — M54.50 SPINE PAIN, LUMBOSACRAL: ICD-10-CM

## 2024-05-23 PROCEDURE — 97110 THERAPEUTIC EXERCISES: CPT | Mod: GP

## 2024-05-23 PROCEDURE — 97530 THERAPEUTIC ACTIVITIES: CPT | Mod: GP

## 2024-05-23 ASSESSMENT — PAIN SCALES - GENERAL: PAINLEVEL_OUTOF10: 0 - NO PAIN

## 2024-05-23 ASSESSMENT — PAIN - FUNCTIONAL ASSESSMENT: PAIN_FUNCTIONAL_ASSESSMENT: 0-10

## 2024-05-23 NOTE — PROGRESS NOTES
Physical Therapy    Physical Therapy Treatment    Patient Name: Alexa Jerry  MRN: [id number]  : 1950   Today's Date: 2024  Time Calculation  Start Time: 0900  Stop Time: 09  Time Calculation (min): 55 min  PT Therapeutic Procedures Time Entry  Therapeutic Exercise Time Entry: 20  Therapeutic Activity Time Entry: 35     Visit # 5  Insurance:          Current Problem  1. Other secondary scoliosis, lumbar region  Follow Up In Physical Therapy      2. Spine pain, lumbosacral  Follow Up In Physical Therapy            Subjective   Pt reports no new complaints, denies falling since last visit. Feels good when she leaves but continues to have pain with certain movements and first thing in the a.m       Precautions  Precautions  STEADI Fall Risk Score (The score of 4 or more indicates an increased risk of falling): 7       Pain  Pain Assessment: 0-10  Pain Score: 0 - No pain  Pain Type: Chronic pain  Pain Location: Back  Pain Orientation: Lower    Objective    Increased time spent on education on positioning, effects of spinal curvature on soft-tissues.        Treatments:                            Date: 5/2 5/15 5/17 5/21   5/23                          VISIT# #1 #2 3 4 5   EXERCISES REPS REPS        - continuation of LB assesssment      NuStep  L1 x 10' Lx 8' L1 x 12' L1 x 10           T- Band         Mid Row   2 x 10 x ytb  2 x 10 xytb    Pull Down   2 x 10 x ytb    2 x 10 x ytb    Chop   Next     T-Band: with core stabilization        DKTC on SB  2 x10 2 x 10  2x10   LTR on SB  2 x10  2 x 10             Hip abduction  3  x 10 x ytb      Ball squeeze  X 10               Seated Trunk flex on SB    x10                    IFC w/ MH   x15 x15    Manual:   LE distraction     HEP   X-review + new       Assessment:  Pt tolerated treatment without complaint of increase in symptoms, responded to treatment with increased understanding of posture and positioning, progressing with pain management and education         Plan:  OP PT Plan  Treatment/Interventions: Education/ Instruction, Electrical stimulation, Cryotherapy, Manual therapy, Neuromuscular re-education, Therapeutic activities, Therapeutic exercises, Ultrasound    OP EDUCATION:       Goals:  Active       L-S pain       1. Pt will demonstrate independence with HEP to reinforce gains made in treatment  (Met)       Start:  05/02/24    Expected End:  05/29/24    Resolved:  05/29/24         2. Pt will report having a decrease in pain to 6/10 at its worst for increased tolerance for performing ADL's  (Progressing)       Start:  05/02/24    Expected End:  06/07/24            3. Pt will have an increase in core and LE strength by 1/3 MMT grade for decreased difficulty with mobility       Start:  05/02/24    Expected End:  06/27/24            4. Pt will have a decrease in Oswestry Disability score to 28% to indicate an increase in functional ability       Start:  05/02/24    Expected End:  06/27/24            5. Pt will demonstrate having a good understanding an performance of transfers and positioning to reduce back pain/strain       Start:  05/02/24    Expected End:  06/07/24                 .

## 2024-05-29 ENCOUNTER — TREATMENT (OUTPATIENT)
Dept: PHYSICAL THERAPY | Facility: HOSPITAL | Age: 74
End: 2024-05-29
Payer: MEDICARE

## 2024-05-29 DIAGNOSIS — M54.50 SPINE PAIN, LUMBOSACRAL: ICD-10-CM

## 2024-05-29 DIAGNOSIS — M41.56 OTHER SECONDARY SCOLIOSIS, LUMBAR REGION: ICD-10-CM

## 2024-05-29 PROCEDURE — 97035 APP MDLTY 1+ULTRASOUND EA 15: CPT | Mod: GP

## 2024-05-29 PROCEDURE — 97110 THERAPEUTIC EXERCISES: CPT | Mod: GP

## 2024-05-29 ASSESSMENT — ENCOUNTER SYMPTOMS
DEPRESSION: 0
OCCASIONAL FEELINGS OF UNSTEADINESS: 1
LOSS OF SENSATION IN FEET: 0

## 2024-05-29 ASSESSMENT — PAIN - FUNCTIONAL ASSESSMENT: PAIN_FUNCTIONAL_ASSESSMENT: 0-10

## 2024-05-29 ASSESSMENT — PAIN SCALES - GENERAL: PAINLEVEL_OUTOF10: 3

## 2024-05-29 NOTE — PROGRESS NOTES
"Physical Therapy    Physical Therapy Treatment    Patient Name: Alexa Jerry  MRN: [id number]  : 1950   Today's Date: 2024  Time Calculation  Start Time: 935  Stop Time:   Time Calculation (min): 50 min  PT Therapeutic Procedures Time Entry  Therapeutic Exercise Time Entry: 40  PT Modalities Time Entry  Ultrasound Time Entry: 10  Visit # 6  Insurance:          Current Problem  1. Other secondary scoliosis, lumbar region  Follow Up In Physical Therapy      2. Spine pain, lumbosacral  Follow Up In Physical Therapy            Subjective   Pt reports no new complaints, pain continues to be the worst when she gets out of bed in the morning, lessens as she gets moving.  Rated pain at 3/10 upon arrival stating that she was walking quickly to get in from the rain, denies falling since last visit       Precautions  Precautions  STEADI Fall Risk Score (The score of 4 or more indicates an increased risk of falling): 4       Pain  Pain Assessment: 0-10  0-10 (Numeric) Pain Score: 3    Objective            Treatments:                              Date:                           VISIT# 6   EXERCISES        NuStep L1 x13'       RB Calf Str 3 x 30\"   Hip Flexor Str 3 x 30\" ea   T- Band: with core stabilization     Mid Row 2x 10 x rtb    Pull Down 2 x15 x ytb    Chop        DKTC on SB 2 x10   LTR on SB     Lateral  2 laps   Hip abduction    Ball squeeze        Seated Trunk flex on SB        US 1.0 MHz 1.5 W/cm2 x 8' to right SI- piriformis region   IFC w/ MH    Manual:    HEP      Assessment:     Pt tolerated treatment without complaint of residual increase in symptoms, responding to treatment with increase in activity level, progressing with core strengthening and pain managment      Plan:  OP PT Plan  Treatment/Interventions: Education/ Instruction, Electrical stimulation, Cryotherapy, Manual therapy, Neuromuscular re-education, Therapeutic activities, Therapeutic exercises, Ultrasound  Progress with core " strengthening           Goals:  Active       L-S pain       1. Pt will demonstrate independence with HEP to reinforce gains made in treatment  (Met)       Start:  05/02/24    Expected End:  05/29/24    Resolved:  05/29/24         2. Pt will report having a decrease in pain to 6/10 at its worst for increased tolerance for performing ADL's  (Progressing)       Start:  05/02/24    Expected End:  06/07/24            3. Pt will have an increase in core and LE strength by 1/3 MMT grade for decreased difficulty with mobility       Start:  05/02/24    Expected End:  06/27/24            4. Pt will have a decrease in Oswestry Disability score to 28% to indicate an increase in functional ability       Start:  05/02/24    Expected End:  06/27/24            5. Pt will demonstrate having a good understanding an performance of transfers and positioning to reduce back pain/strain       Start:  05/02/24    Expected End:  06/07/24                 .

## 2024-05-30 ASSESSMENT — ENCOUNTER SYMPTOMS: OCCASIONAL FEELINGS OF UNSTEADINESS: 1

## 2024-05-31 ENCOUNTER — TREATMENT (OUTPATIENT)
Dept: PHYSICAL THERAPY | Facility: HOSPITAL | Age: 74
End: 2024-05-31
Payer: MEDICARE

## 2024-05-31 DIAGNOSIS — M54.50 SPINE PAIN, LUMBOSACRAL: ICD-10-CM

## 2024-05-31 DIAGNOSIS — M41.56 OTHER SECONDARY SCOLIOSIS, LUMBAR REGION: ICD-10-CM

## 2024-05-31 PROCEDURE — 97035 APP MDLTY 1+ULTRASOUND EA 15: CPT | Mod: GP

## 2024-05-31 PROCEDURE — 97110 THERAPEUTIC EXERCISES: CPT | Mod: GP

## 2024-05-31 ASSESSMENT — PAIN SCALES - GENERAL: PAINLEVEL_OUTOF10: 0 - NO PAIN

## 2024-05-31 ASSESSMENT — PAIN - FUNCTIONAL ASSESSMENT: PAIN_FUNCTIONAL_ASSESSMENT: 0-10

## 2024-05-31 NOTE — PROGRESS NOTES
"      Physical Therapy    Physical Therapy Treatment    Patient Name: Alexa Jerry  MRN: [id number]  : 1950   Today's Date: 2024  Time Calculation  Start Time: 903 (In bathroom)  Stop Time: 945  Time Calculation (min): 42 min  PT Therapeutic Procedures Time Entry  Manual Therapy Time Entry: 5  Therapeutic Exercise Time Entry: 24  Therapeutic Activity Time Entry: 3  PT Modalities Time Entry  Ultrasound Time Entry: 10  Visit # 7  Insurance:          Current Problem  1. Other secondary scoliosis, lumbar region  Follow Up In Physical Therapy      2. Spine pain, lumbosacral  Follow Up In Physical Therapy            Subjective   Pt reports no pain upon arrival, continues to be worst in a.m and with certain movements       Precautions  Precautions  STEADI Fall Risk Score (The score of 4 or more indicates an increased risk of falling): 7       Pain  Pain Assessment: 0-10  Pain Score: 0 - No pain  Pain Type: Chronic pain  Pain Location: Back  Pain Orientation: Lower    Objective            Treatments:                            Date:                           VISIT# 6 7   EXERCISES          NuStep L1 x13' L1 x 10'        RB Calf Str 3 x 30\"    Hip Flexor Str 3 x 30\" ea 3 x30   T- Band: with core stabilization      Mid Row 2x 10 x rtb     Pull Down 2 x15 x ytb     Chop  2 x 10 x ytb        DKTC on SB 2 x10 2 x 10    LTR on SB  2 x 10     Lateral  2 laps    Hip abduction     Ball squeeze          Seated Trunk flex on SB          US 1.0 MHz 1.5 W/cm2 x 8' to right SI- piriformis region 1.0 MHz 1.5 W/cm2 x 8' to right SI- piriformis region   IFC w/ MH     Manual:  -distraction   HEP       Assessment:  Pt tolerated treatment without complaint of residual increase in symptoms, responding to treatment with increase in core strengthening, progressing with pain management, education and strengthening        Plan:   Progress with core strengthening           Goals:  Active       L-S pain       1. Pt will " demonstrate independence with HEP to reinforce gains made in treatment  (Met)       Start:  05/02/24    Expected End:  05/29/24    Resolved:  05/29/24         2. Pt will report having a decrease in pain to 6/10 at its worst for increased tolerance for performing ADL's  (Progressing)       Start:  05/02/24    Expected End:  06/07/24            3. Pt will have an increase in core and LE strength by 1/3 MMT grade for decreased difficulty with mobility       Start:  05/02/24    Expected End:  06/27/24            4. Pt will have a decrease in Oswestry Disability score to 28% to indicate an increase in functional ability       Start:  05/02/24    Expected End:  06/27/24            5. Pt will demonstrate having a good understanding an performance of transfers and positioning to reduce back pain/strain       Start:  05/02/24    Expected End:  06/07/24                 .

## 2024-06-03 ENCOUNTER — TREATMENT (OUTPATIENT)
Dept: PHYSICAL THERAPY | Facility: HOSPITAL | Age: 74
End: 2024-06-03
Payer: MEDICARE

## 2024-06-03 DIAGNOSIS — M54.50 SPINE PAIN, LUMBOSACRAL: ICD-10-CM

## 2024-06-03 DIAGNOSIS — M41.56 OTHER SECONDARY SCOLIOSIS, LUMBAR REGION: ICD-10-CM

## 2024-06-03 PROCEDURE — 97140 MANUAL THERAPY 1/> REGIONS: CPT | Mod: GP

## 2024-06-03 PROCEDURE — 97110 THERAPEUTIC EXERCISES: CPT | Mod: GP

## 2024-06-03 NOTE — PROGRESS NOTES
"Physical Therapy    Physical Therapy Treatment    Patient Name: Alexa Jerry  MRN: [id number]  : 1950   Today's Date: 6/3/2024  Time Calculation  Start Time: 932  Stop Time:   Time Calculation (min): 43 min  PT Therapeutic Procedures Time Entry  Manual Therapy Time Entry: 23  Therapeutic Exercise Time Entry: 20     Visit # 8  Insurance:          Current Problem  1. Other secondary scoliosis, lumbar region  Follow Up In Physical Therapy      2. Spine pain, lumbosacral  Follow Up In Physical Therapy            Subjective   Pt reports no new complaints, denies falling since last visit.      Precautions  Precautions  STEADI Fall Risk Score (The score of 4 or more indicates an increased risk of falling): 7       Pain  Pain Assessment: 0-10  Pain Score: 0 - No pain  Pain Type: Chronic pain  Pain Location: Back  Pain Orientation: Lower    Objective            Treatments:  Manual: in side-lying  - Manual hip flexor str  -Iliacus and Piriformis Release/STM  Review of t-band ex - instr on not flexing wrists- due to c/o forearm pain                            Date:  66                          VISIT# 6 7 8   EXERCISES            NuStep L1 x13' L1 x 10' L1 x 10'         RB Calf Str 3 x 30\"  3 x 30\"   Hip Flexor Str 3 x 30\" ea 3 x30    T- Band: with core stabilization       Mid Row 2x 10 x rtb  Reviewed     Pull Down 2 x15 x ytb  reviewed    Chop  2 x 10 x ytb          DKTC on SB 2 x10 2 x 10     LTR on SB  2 x 10      Lateral  2 laps     Hip abduction      Ball squeeze            Seated Trunk flex on SB            US 1.0 MHz 1.5 W/cm2 x 8' to right SI- piriformis region 1.0 MHz 1.5 W/cm2 x 8' to right SI- piriformis region    IFC w/ MH      Manual:  -distraction X- see above   HEP        Assessment:  Pt tolerated treatment without complaint of increase in symptoms, responding to treatment with increase in tolerance for STM/ MFR.  Begin prepping for possible discharge- follow up with Dr Ragsdale next week   "     Plan:  OP PT Plan  Treatment/Interventions: Education/ Instruction, Electrical stimulation, Cryotherapy, Manual therapy, Neuromuscular re-education, Therapeutic activities, Therapeutic exercises, Ultrasound  Progress with core strengthening and postural awareness           Goals:  Active       L-S pain       1. Pt will demonstrate independence with HEP to reinforce gains made in treatment  (Met)       Start:  05/02/24    Expected End:  05/29/24    Resolved:  05/29/24         2. Pt will report having a decrease in pain to 6/10 at its worst for increased tolerance for performing ADL's  (Progressing)       Start:  05/02/24    Expected End:  06/07/24            3. Pt will have an increase in core and LE strength by 1/3 MMT grade for decreased difficulty with mobility       Start:  05/02/24    Expected End:  06/27/24            4. Pt will have a decrease in Oswestry Disability score to 28% to indicate an increase in functional ability       Start:  05/02/24    Expected End:  06/27/24            5. Pt will demonstrate having a good understanding an performance of transfers and positioning to reduce back pain/strain       Start:  05/02/24    Expected End:  06/07/24                 .

## 2024-06-06 ENCOUNTER — TREATMENT (OUTPATIENT)
Dept: PHYSICAL THERAPY | Facility: HOSPITAL | Age: 74
End: 2024-06-06
Payer: MEDICARE

## 2024-06-06 DIAGNOSIS — M54.50 SPINE PAIN, LUMBOSACRAL: Primary | ICD-10-CM

## 2024-06-06 DIAGNOSIS — M41.56 OTHER SECONDARY SCOLIOSIS, LUMBAR REGION: ICD-10-CM

## 2024-06-06 PROCEDURE — 97110 THERAPEUTIC EXERCISES: CPT | Mod: GP,CQ

## 2024-06-06 ASSESSMENT — PAIN SCALES - GENERAL
PAINLEVEL_OUTOF10: 0 - NO PAIN
PAINLEVEL_OUTOF10: 3

## 2024-06-06 ASSESSMENT — PAIN - FUNCTIONAL ASSESSMENT
PAIN_FUNCTIONAL_ASSESSMENT: 0-10
PAIN_FUNCTIONAL_ASSESSMENT: 0-10

## 2024-06-06 NOTE — PROGRESS NOTES
"Physical Therapy    Physical Therapy Treatment    Patient Name: Alexa Jerry  MRN: [id number]  Today's Date: 6/6/2024  Time Calculation  Start Time: 0945  Stop Time: 1030  Time Calculation (min): 45 min    PT Therapeutic Procedures Time Entry  Therapeutic Exercise Time Entry: 45          VISIT:# 9    Current Problem  1. Spine pain, lumbosacral  Follow Up In Physical Therapy      2. Other secondary scoliosis, lumbar region  Follow Up In Physical Therapy          Subjective   Pt reports she is doing her Hep.  Pt reports she needs reminders during therapy as she has pre dementia      Precautions  Precautions  STEADI Fall Risk Score (The score of 4 or more indicates an increased risk of falling): 7  Precautions Comment: none       Pain  Pain Assessment: 0-10  Pain Score: 3  Pain Type: Chronic pain  Pain Location: Back  Pain Orientation: Lower       Objective    Added seated trunk flexion stretches with SB        Treatments:                            Date: 5/29 5/31 6/3 6/6/2024                           VISIT# 6 7 8 9   EXERCISES              NuStep L1 x13' L1 x 10' L1 x 10' 10' @L2          RB Calf Str 3 x 30\"  3 x 30\" 30\" x 3   Hip Flexor Str 3 x 30\" ea 3 x30     T- Band: with core stabilization        Mid Row 2x 10 x rtb  Reviewed      Pull Down 2 x15 x ytb  reviewed     Chop  2 x 10 x ytb            DKTC on SB 2 x10 2 x 10   2 x 10 org   LTR on SB  2 x 10   2 x 10 org    Lateral  2 laps      Hip abduction       Ball squeeze              Seated Trunk flex on SB    10x fwd  10x lat  each          US 1.0 MHz 1.5 W/cm2 x 8' to right SI- piriformis region 1.0 MHz 1.5 W/cm2 x 8' to right SI- piriformis region     IFC w/ MH       Manual:  -distraction X- see above    HEP           Assessment:   Pt had no increased pain today.  Pt could feel a stretch when doing the SB stretches.  Minimal cues needed.  Pt had pain with supine to sit, discussed a better way to get oob       Plan: try SB stretches again.  Issue as HEP if all " goes well.    OP PT Plan  Treatment/Interventions: Education/ Instruction, Electrical stimulation, Cryotherapy, Manual therapy, Neuromuscular re-education, Therapeutic activities, Therapeutic exercises, Ultrasound  PT Plan: Skilled PT  PT Frequency: 2 times per week  Duration: 6-8 wks    Goals:  Active       L-S pain       1. Pt will demonstrate independence with HEP to reinforce gains made in treatment  (Met)       Start:  05/02/24    Expected End:  05/29/24    Resolved:  05/29/24         2. Pt will report having a decrease in pain to 6/10 at its worst for increased tolerance for performing ADL's  (Progressing)       Start:  05/02/24    Expected End:  06/07/24            3. Pt will have an increase in core and LE strength by 1/3 MMT grade for decreased difficulty with mobility       Start:  05/02/24    Expected End:  06/27/24            4. Pt will have a decrease in Oswestry Disability score to 28% to indicate an increase in functional ability       Start:  05/02/24    Expected End:  06/27/24            5. Pt will demonstrate having a good understanding an performance of transfers and positioning to reduce back pain/strain       Start:  05/02/24    Expected End:  06/07/24

## 2024-06-10 ENCOUNTER — TREATMENT (OUTPATIENT)
Dept: PHYSICAL THERAPY | Facility: HOSPITAL | Age: 74
End: 2024-06-10
Payer: MEDICARE

## 2024-06-10 DIAGNOSIS — M54.50 SPINE PAIN, LUMBOSACRAL: ICD-10-CM

## 2024-06-10 DIAGNOSIS — M41.56 OTHER SECONDARY SCOLIOSIS, LUMBAR REGION: ICD-10-CM

## 2024-06-10 PROCEDURE — 97110 THERAPEUTIC EXERCISES: CPT | Mod: GP

## 2024-06-10 PROCEDURE — 97140 MANUAL THERAPY 1/> REGIONS: CPT | Mod: GP

## 2024-06-10 ASSESSMENT — PAIN - FUNCTIONAL ASSESSMENT: PAIN_FUNCTIONAL_ASSESSMENT: 0-10

## 2024-06-10 NOTE — PROGRESS NOTES
"Physical Therapy    Physical Therapy Treatment    Patient Name: Alexa Jerry  MRN: [id number]  : 1950   Today's Date: 6/10/2024  Time Calculation  Start Time: 928  Stop Time:   Time Calculation (min): 45 min  PT Therapeutic Procedures Time Entry  Manual Therapy Time Entry: 10  Therapeutic Exercise Time Entry: 35     Visit # 10  Insurance:          Current Problem  1. Other secondary scoliosis, lumbar region  Follow Up In Physical Therapy      2. Spine pain, lumbosacral  Follow Up In Physical Therapy            Subjective   Pt reports no new complaints, denies falling since last visit.  Pt states that pain continues to be the worst in am, with rotation and prolonged walking     Precautions  Precautions  STEADI Fall Risk Score (The score of 4 or more indicates an increased risk of falling): 7       Pain  Pain Assessment: 0-10  Pain Type: Chronic pain  Pain Location: Back  Pain Orientation: Lower    Objective          Treatments:                            Date: 5/29 5/31 6/3 2024  6/10                          VISIT# 6 7 8 9    EXERCISES                NuStep L1 x13' L1 x 10' L1 x 10' 10' @L2 10'@L1           RB Calf Str 3 x 30\"  3 x 30\" 30\" x 3 3 x 30   Hip Flexor Str 3 x 30\" ea 3 x30   3x 30'   T- Band: with core stabilization         Mid Row 2x 10 x rtb  Reviewed       Pull Down 2 x15 x ytb  reviewed      Chop  2 x 10 x ytb              DKTC on SB 2 x10 2 x 10   2 x 10 org 2 x 10   LTR on SB  2 x 10   2 x 10 org     Lateral  2 laps       Hip abduction     2 x 10 x rtb   Ball squeeze     2 x 10           Seated Trunk flex on SB    10x fwd  10x lat  each            US 1.0 MHz 1.5 W/cm2 x 8' to right SI- piriformis region 1.0 MHz 1.5 W/cm2 x 8' to right SI- piriformis region      IFC w/ MH        Manual:  -distraction X- see above  x   HEP          Assessment:  Pt tolerated treatment without complaint of residual increase in symptoms, pt with c/o vertigo after performing sit to supinere that resolved " quickly, responded to treatment with report of decrease in pain,      Plan:   Progress with pain management and core strengthening as tolerated           Goals:  Active       L-S pain       1. Pt will demonstrate independence with HEP to reinforce gains made in treatment  (Met)       Start:  05/02/24    Expected End:  05/29/24    Resolved:  05/29/24         2. Pt will report having a decrease in pain to 6/10 at its worst for increased tolerance for performing ADL's  (Progressing)       Start:  05/02/24    Expected End:  06/07/24            3. Pt will have an increase in core and LE strength by 1/3 MMT grade for decreased difficulty with mobility       Start:  05/02/24    Expected End:  06/27/24            4. Pt will have a decrease in Oswestry Disability score to 28% to indicate an increase in functional ability       Start:  05/02/24    Expected End:  06/27/24            5. Pt will demonstrate having a good understanding an performance of transfers and positioning to reduce back pain/strain       Start:  05/02/24    Expected End:  06/07/24                 .

## 2024-06-13 ENCOUNTER — TREATMENT (OUTPATIENT)
Dept: PHYSICAL THERAPY | Facility: HOSPITAL | Age: 74
End: 2024-06-13
Payer: MEDICARE

## 2024-06-13 DIAGNOSIS — M41.56 OTHER SECONDARY SCOLIOSIS, LUMBAR REGION: ICD-10-CM

## 2024-06-13 DIAGNOSIS — M54.50 SPINE PAIN, LUMBOSACRAL: ICD-10-CM

## 2024-06-13 PROCEDURE — 97140 MANUAL THERAPY 1/> REGIONS: CPT | Mod: GP

## 2024-06-13 PROCEDURE — 97110 THERAPEUTIC EXERCISES: CPT | Mod: GP

## 2024-06-13 PROCEDURE — 97035 APP MDLTY 1+ULTRASOUND EA 15: CPT | Mod: GP

## 2024-06-13 ASSESSMENT — PAIN - FUNCTIONAL ASSESSMENT: PAIN_FUNCTIONAL_ASSESSMENT: 0-10

## 2024-06-13 ASSESSMENT — PAIN SCALES - GENERAL: PAINLEVEL_OUTOF10: 2

## 2024-06-13 NOTE — PROGRESS NOTES
"Physical Therapy    Physical Therapy Treatment    Patient Name: Alexa Jerry  MRN: [id number]  : 1950   Today's Date: 2024  Time Calculation  Start Time: 917  Stop Time: 1003  Time Calculation (min): 46 min  PT Therapeutic Procedures Time Entry  Manual Therapy Time Entry: 15  Therapeutic Exercise Time Entry: 21  PT Modalities Time Entry  Ultrasound Time Entry: 10  Visit # 11  Insurance:          Current Problem  1. Other secondary scoliosis, lumbar region  Follow Up In Physical Therapy      2. Spine pain, lumbosacral  Follow Up In Physical Therapy            Subjective   Pt reports that she has been really sore in the EBONY SI region the last 2 days       Precautions  Precautions  STEADI Fall Risk Score (The score of 4 or more indicates an increased risk of falling): 7       Pain  Pain Assessment: 0-10  0-10 (Numeric) Pain Score: 2  Pain Type: Chronic pain  Pain Location: Back  Pain Orientation: Lower    Objective            Treatments:                            Date: 6/3 2024  6/10 6/13                          VISIT# 8 9 10 11   EXERCISES              NuStep L1 x 10' 10' @L2 10'@L1 12' @          RB Calf Str 3 x 30\" 30\" x 3 3 x 30    Hip Flexor Str   3x 30'    HS     3 x 30\"   T- Band: with core stabilization        Mid Row Reviewed        Pull Down reviewed       Chop              DKTC on SB  2 x 10 org 2 x 10    LTR on SB  2 x 10 org      Lateral        Hip abduction   2 x 10 x rtb    Ball squeeze   2 x 10           Transvers abd setting    2x10   Seated Trunk flex on SB  10x fwd  10x lat  each            US    1.0MHz 1.4 w/cm2 x 8'   IFC w/ MH       Manual: X- see above  x x   HEP         Assessment:  Pt tolerated treatment without complaint of increase in symptoms, responded to treatment with decrease in pain and increase in mobility        Plan:   Progress with core strengthening and pain management       Goals:  Active       L-S pain       1. Pt will demonstrate independence with HEP to " reinforce gains made in treatment  (Met)       Start:  05/02/24    Expected End:  05/29/24    Resolved:  05/29/24         2. Pt will report having a decrease in pain to 6/10 at its worst for increased tolerance for performing ADL's  (Progressing)       Start:  05/02/24    Expected End:  06/07/24            3. Pt will have an increase in core and LE strength by 1/3 MMT grade for decreased difficulty with mobility       Start:  05/02/24    Expected End:  06/27/24            4. Pt will have a decrease in Oswestry Disability score to 28% to indicate an increase in functional ability       Start:  05/02/24    Expected End:  06/27/24            5. Pt will demonstrate having a good understanding an performance of transfers and positioning to reduce back pain/strain       Start:  05/02/24    Expected End:  06/07/24                 .

## 2024-06-21 ENCOUNTER — TREATMENT (OUTPATIENT)
Dept: PHYSICAL THERAPY | Facility: HOSPITAL | Age: 74
End: 2024-06-21
Payer: MEDICARE

## 2024-06-21 DIAGNOSIS — M54.50 SPINE PAIN, LUMBOSACRAL: ICD-10-CM

## 2024-06-21 DIAGNOSIS — M41.56 OTHER SECONDARY SCOLIOSIS, LUMBAR REGION: ICD-10-CM

## 2024-06-21 PROCEDURE — 97530 THERAPEUTIC ACTIVITIES: CPT | Mod: GP

## 2024-06-21 PROCEDURE — 97014 ELECTRIC STIMULATION THERAPY: CPT | Mod: GP

## 2024-06-21 PROCEDURE — 97112 NEUROMUSCULAR REEDUCATION: CPT | Mod: GP

## 2024-06-21 ASSESSMENT — PAIN - FUNCTIONAL ASSESSMENT: PAIN_FUNCTIONAL_ASSESSMENT: 0-10

## 2024-06-21 ASSESSMENT — PAIN SCALES - GENERAL: PAINLEVEL_OUTOF10: 3

## 2024-06-21 NOTE — PROGRESS NOTES
"Physical Therapy    Physical Therapy Treatment    Patient Name: Alexa Jerry  MRN: [id number]  : 1950   Today's Date: 2024  Time Calculation  Start Time: 1017  Stop Time: 1115  Time Calculation (min): 58 min  PT Therapeutic Procedures Time Entry  Neuromuscular Re-Education Time Entry: 28  Therapeutic Activity Time Entry: 15  PT Modalities Time Entry  E-Stim (Unattended) Time Entry: 15  Visit # 12  Insurance:          Current Problem  1. Other secondary scoliosis, lumbar region  Follow Up In Physical Therapy      2. Spine pain, lumbosacral  Follow Up In Physical Therapy            Subjective   Pt reports that she could not get comfort or relief from pressure while riding in the car yesterday to see her daughter.       Precautions  Precautions  STEADI Fall Risk Score (The score of 4 or more indicates an increased risk of falling): 7       Pain  Pain Assessment: 0-10  0-10 (Numeric) Pain Score: 3  Pain Type: Chronic pain  Pain Location: Back  Pain Orientation: Lower    Objective     Hip flexion: 3+/5 EBONY pain on the right only   Chart with various pad placements for TENS issued  Various pad placement and settings utilized to find most comfortable setting for pt.  Education on positioning      Outcome Measures:  Other Measures  Oswestry Disablity Index (JABIER): 36%    Treatments:                            Date: 5/29 5/31 6/3 2024  6/13 6/21                          VISIT# 6 7 8 9 10 12   EXERCISES                  NuStep L1 x13' L1 x 10' L1 x 10' 10' @L2 10 10' @L2         HS str 3 30 ea   RB Calf Str 3 x 30\"  3 x 30\" 30\" x 3 3 x 30    Hip Flexor Str 3 x 30\" ea 3 x30       T- Band: with core stabilization          Mid Row 2x 10 x rtb  Reviewed        Pull Down 2 x15 x ytb  reviewed       Chop  2 x 10 x ytb                DKTC on SB 2 x10 2 x 10   2 x 10 org     LTR on SB  2 x 10   2 x 10 org      Lateral  2 laps        Hip abduction         Ball squeeze                  Seated Trunk flex on SB    10x " fwd  10x lat  each  X5 w/o ball            US 1.0 MHz 1.5 W/cm2 x 8' to right SI- piriformis region 1.0 MHz 1.5 W/cm2 x 8' to right SI- piriformis region       IFC w/ MH      Ed, inst & application of home tens units-    Manual:  -distraction X- see above      HEP           Assessment:  Pt appears to have a good understanding of education provided on TEN's use.  Pt to trial on sample unit over the w/e       Plan:  OP PT Plan  Treatment/Interventions: Education/ Instruction, Electrical stimulation, Cryotherapy, Manual therapy, Neuromuscular re-education, Therapeutic activities, Therapeutic exercises, Ultrasound           Goals:  Active       L-S pain       1. Pt will demonstrate independence with HEP to reinforce gains made in treatment  (Met)       Start:  05/02/24    Expected End:  05/29/24    Resolved:  05/29/24         2. Pt will report having a decrease in pain to 6/10 at its worst for increased tolerance for performing ADL's  (Progressing)       Start:  05/02/24    Expected End:  06/07/24            3. Pt will have an increase in core and LE strength by 1/3 MMT grade for decreased difficulty with mobility       Start:  05/02/24    Expected End:  06/27/24            4. Pt will have a decrease in Oswestry Disability score to 28% to indicate an increase in functional ability       Start:  05/02/24    Expected End:  06/27/24            5. Pt will demonstrate having a good understanding an performance of transfers and positioning to reduce back pain/strain       Start:  05/02/24    Expected End:  06/07/24                 .

## 2024-06-25 ENCOUNTER — TREATMENT (OUTPATIENT)
Dept: PHYSICAL THERAPY | Facility: HOSPITAL | Age: 74
End: 2024-06-25
Payer: MEDICARE

## 2024-06-25 DIAGNOSIS — M41.56 OTHER SECONDARY SCOLIOSIS, LUMBAR REGION: ICD-10-CM

## 2024-06-25 DIAGNOSIS — M54.50 SPINE PAIN, LUMBOSACRAL: ICD-10-CM

## 2024-06-25 PROCEDURE — 97110 THERAPEUTIC EXERCISES: CPT | Mod: GP

## 2024-06-25 ASSESSMENT — PAIN SCALES - GENERAL: PAINLEVEL_OUTOF10: 1

## 2024-06-25 ASSESSMENT — PAIN - FUNCTIONAL ASSESSMENT: PAIN_FUNCTIONAL_ASSESSMENT: 0-10

## 2024-06-25 NOTE — PROGRESS NOTES
"Physical Therapy    Physical Therapy Treatment    Patient Name: Alexa Jerry  MRN: [id number]  : 1950   Today's Date: 2024  Time Calculation  Start Time: 947  Stop Time:   Time Calculation (min): 42 min  PT Therapeutic Procedures Time Entry  Manual Therapy Time Entry: 5  Therapeutic Exercise Time Entry: 37     Visit # 13  Insurance:          Current Problem  1. Other secondary scoliosis, lumbar region  Follow Up In Physical Therapy      2. Spine pain, lumbosacral  Follow Up In Physical Therapy            Subjective   Pt reports no new complaints, states that she didn't get to use the TEN's unit.  Pt states that she still gets pain with certain movements, not as intense or as frequent but still there.       Precautions  Precautions  STEADI Fall Risk Score (The score of 4 or more indicates an increased risk of falling): 7       Pain  Pain Assessment: 0-10  0-10 (Numeric) Pain Score: 1  Pain Type: Chronic pain  Pain Location: Back  Pain Orientation: Lower    Objective      Initiation of DLP on shuttle, vc's for performing slow, controlled movements  Treatments:                              Date: 6/3 2024  6/13 6/21 6/25                          VISIT# 8 9 10 12 13   EXERCISES                NuStep L1 x 10' 10' @L2 10 10' @L2 10' @L2       HS str 3 30 ea    RB Calf Str 3 x 30\" 30\" x 3 3 x 30  3 x 30\"   Hip Flexor Str        T- Band: with core stabilization         Mid Row Reviewed         Pull Down reviewed        Chop                DKTC on SB  2 x 10 org      LTR on SB  2 x 10 org       Lateral         Hip abduction     3 x 10 x rtb   Ball squeeze        Shuttle:  DLP     2 x 10 x 4B   Seated Trunk flex on SB  10x fwd  10x lat  each  X5 w/o ball X5  x15 sec           US        IFC w/ MH    Ed, inst & application of home tens units-     Manual: X- see above    5'- STM to R SI region   HEP     reviewed     Assessment:  Pt tolerated treatment without complaint of increase in symptoms, responding to " treatment with increasing activity tolerance, progressing with core strengthening        Plan:  OP PT Plan  Treatment/Interventions: Education/ Instruction, Electrical stimulation, Cryotherapy, Manual therapy, Neuromuscular re-education, Therapeutic activities, Therapeutic exercises, Ultrasound  Progress with core strengthening         Goals:  Active       L-S pain       1. Pt will demonstrate independence with HEP to reinforce gains made in treatment  (Met)       Start:  05/02/24    Expected End:  05/29/24    Resolved:  05/29/24         2. Pt will report having a decrease in pain to 6/10 at its worst for increased tolerance for performing ADL's  (Progressing)       Start:  05/02/24    Expected End:  06/07/24            3. Pt will have an increase in core and LE strength by 1/3 MMT grade for decreased difficulty with mobility       Start:  05/02/24    Expected End:  06/27/24            4. Pt will have a decrease in Oswestry Disability score to 28% to indicate an increase in functional ability       Start:  05/02/24    Expected End:  06/27/24            5. Pt will demonstrate having a good understanding an performance of transfers and positioning to reduce back pain/strain       Start:  05/02/24    Expected End:  06/07/24                 .

## 2024-06-28 ENCOUNTER — TREATMENT (OUTPATIENT)
Dept: PHYSICAL THERAPY | Facility: HOSPITAL | Age: 74
End: 2024-06-28
Payer: MEDICARE

## 2024-06-28 DIAGNOSIS — M41.56 OTHER SECONDARY SCOLIOSIS, LUMBAR REGION: ICD-10-CM

## 2024-06-28 DIAGNOSIS — M54.50 SPINE PAIN, LUMBOSACRAL: ICD-10-CM

## 2024-06-28 PROCEDURE — 97110 THERAPEUTIC EXERCISES: CPT | Mod: GP

## 2024-06-28 PROCEDURE — 97140 MANUAL THERAPY 1/> REGIONS: CPT | Mod: GP

## 2024-06-28 ASSESSMENT — PAIN SCALES - GENERAL: PAINLEVEL_OUTOF10: 2

## 2024-06-28 ASSESSMENT — PAIN - FUNCTIONAL ASSESSMENT: PAIN_FUNCTIONAL_ASSESSMENT: 0-10

## 2024-06-28 NOTE — PROGRESS NOTES
"Physical Therapy    Physical Therapy Treatment    Patient Name: Alexa Jerry  MRN: [id number]  : 1950   Today's Date: 2024  Time Calculation  Start Time: 1032  Stop Time: 1114  Time Calculation (min): 42 min  PT Therapeutic Procedures Time Entry  Manual Therapy Time Entry: 10  Therapeutic Exercise Time Entry: 32     Visit # 14  Insurance:          Current Problem  1. Other secondary scoliosis, lumbar region  Follow Up In Physical Therapy      2. Spine pain, lumbosacral  Follow Up In Physical Therapy            Subjective   Pt reports no new complaints, denies falling since last visit.        Precautions  Precautions  STEADI Fall Risk Score (The score of 4 or more indicates an increased risk of falling): 7       Pain  Pain Assessment: 0-10  0-10 (Numeric) Pain Score: 2  Pain Type: Chronic pain  Pain Location: Back  Pain Orientation: Lower    Objective      Continued education on back safety and abdominal stabilization.        Treatments:                            Date: 6/3 2024  6/13 6/21 6/25 6/28                          VISIT# 8 9 10 12 13 14   EXERCISES                  NuStep L1 x 10' 10' @L2 10 10' @L2 10' @L2 10' @ L2       HS str 3 30 ea     RB Calf Str 3 x 30\" 30\" x 3 3 x 30  3 x 30\" 3 x 30   Hip Flexor Str         T- Band: with core stabilization          Mid Row Reviewed          Pull Down reviewed         Chop                  DKTC on SB  2 x 10 org    2 x 10    LTR on SB  2 x 10 org        Lateral          Hip abduction     3 x 10 x rtb 3 x 10 x rtb   Ball squeeze         Shuttle:  DLP     2 x 10 x 4B 2 x 10 x 4b   Seated Trunk flex on SB  10x fwd  10x lat  each  X5 w/o ball X5  x15 sec             US   x      IFC w/ MH    Ed, inst & application of home tens units-      Manual: X- see above    5'- STM to R SI region x   HEP     reviewed      Assessment:  Pt tolerated treatment without complaint of increase in symptoms, responding to treatment with increase in core strength and postural " awareness, progressing with pain management and core strengthening      Plan:   Progress with core strengthening           Goals:  Resolved       L-S pain       1. Pt will demonstrate independence with HEP to reinforce gains made in treatment  (Met)       Start:  05/02/24    Expected End:  05/29/24    Resolved:  05/29/24         2. Pt will report having a decrease in pain to 6/10 at its worst for increased tolerance for performing ADL's  (Adequate for Discharge)       Start:  05/02/24    Expected End:  06/07/24            3. Pt will have an increase in core and LE strength by 1/3 MMT grade for decreased difficulty with mobility (Adequate for Discharge)       Start:  05/02/24    Expected End:  06/27/24            4. Pt will have a decrease in Oswestry Disability score to 28% to indicate an increase in functional ability (Met)       Start:  05/02/24    Expected End:  06/27/24    Resolved:  07/08/24         5. Pt will demonstrate having a good understanding an performance of transfers and positioning to reduce back pain/strain (Met)       Start:  05/02/24    Expected End:  06/07/24    Resolved:  07/08/24              .

## 2024-07-02 ENCOUNTER — TREATMENT (OUTPATIENT)
Dept: PHYSICAL THERAPY | Facility: HOSPITAL | Age: 74
End: 2024-07-02
Payer: MEDICARE

## 2024-07-02 DIAGNOSIS — M41.56 OTHER SECONDARY SCOLIOSIS, LUMBAR REGION: ICD-10-CM

## 2024-07-02 DIAGNOSIS — M54.50 SPINE PAIN, LUMBOSACRAL: ICD-10-CM

## 2024-07-02 PROCEDURE — 97110 THERAPEUTIC EXERCISES: CPT | Mod: GP

## 2024-07-02 NOTE — PROGRESS NOTES
"Physical Therapy    Physical Therapy Treatment    Patient Name: Alexa Jerry  MRN: [id number]  : 1950   Today's Date: 2024  Time Calculation  Start Time: 1030  Stop Time: 1115  Time Calculation (min): 45 min  PT Therapeutic Procedures Time Entry  Therapeutic Exercise Time Entry: 45     Visit # 15  Insurance:          Current Problem  1. Other secondary scoliosis, lumbar region  Follow Up In Physical Therapy      2. Spine pain, lumbosacral  Follow Up In Physical Therapy            Subjective   Pt reports that she has only really had 2 \"sharpies\" since last visit.     Precautions  Precautions  STEADI Fall Risk Score (The score of 4 or more indicates an increased risk of falling): 7       Pain  Pain Assessment: 0-10  0-10 (Numeric) Pain Score: 2  Pain Type: Chronic pain  Pain Location: Back  Pain Orientation: Lower  Best: 0/10   Worst: 9-10 with quick sharp pains     Gets a lot of pressure with prolonged activities.    Objective        Outcome Measures:   Oswestry Disablity Index (JABIER): 28     Treatments:                            Date:                           VISIT# 10 12 13    EXERCISES    Re-check          NuStep 10 10' @L2 10' @L2 10     HS str 3 30 ea     RB Calf Str 3 x 30  3 x 30\"    Hip Flexor Str       T- Band: with core stabilization        Mid Row        Pull Down        Chop              Clam shells    2 x 10          DKTC on SB    2 x 10   LTR on SB        Lateral        Hip abduction   3 x 10 x rtb    Ball squeeze       Shuttle:  DLP   2 x 10 x 4B    Seated Trunk flex on SB  X5 w/o ball X5  x15 sec                  IF w/   Ed, inst & application of home tens units-      Manual:   5'- STM to R SI region    HEP   reviewed x     Assessment:     Pt has responded to treatment with decrease in pain, increase in strength and mobility.  Pt continues to have decreased activity tolerance and pain with trunk rotation. Pt is independent with HEP and agreeable to discharge at this " time to continue with HEP while she returns to Dr Ragsdale    Plan:  OP EDUCATION:   Discharge to HEP    Goals:  Active       L-S pain       1. Pt will demonstrate independence with HEP to reinforce gains made in treatment  (Met)       Start:  05/02/24    Expected End:  05/29/24    Resolved:  05/29/24         2. Pt will report having a decrease in pain to 6/10 at its worst for increased tolerance for performing ADL's  (Progressing)       Start:  05/02/24    Expected End:  06/07/24            3. Pt will have an increase in core and LE strength by 1/3 MMT grade for decreased difficulty with mobility       Start:  05/02/24    Expected End:  06/27/24            4. Pt will have a decrease in Oswestry Disability score to 28% to indicate an increase in functional ability       Start:  05/02/24    Expected End:  06/27/24            5. Pt will demonstrate having a good understanding an performance of transfers and positioning to reduce back pain/strain       Start:  05/02/24    Expected End:  06/07/24                 .

## 2024-07-05 ENCOUNTER — APPOINTMENT (OUTPATIENT)
Dept: PHYSICAL THERAPY | Facility: HOSPITAL | Age: 74
End: 2024-07-05
Payer: MEDICARE

## 2024-07-08 ASSESSMENT — PAIN - FUNCTIONAL ASSESSMENT: PAIN_FUNCTIONAL_ASSESSMENT: 0-10

## 2024-07-08 ASSESSMENT — PAIN SCALES - GENERAL: PAINLEVEL_OUTOF10: 2

## 2024-07-09 ENCOUNTER — APPOINTMENT (OUTPATIENT)
Dept: PHYSICAL THERAPY | Facility: HOSPITAL | Age: 74
End: 2024-07-09
Payer: MEDICARE

## 2024-07-11 ENCOUNTER — APPOINTMENT (OUTPATIENT)
Dept: PHYSICAL THERAPY | Facility: HOSPITAL | Age: 74
End: 2024-07-11
Payer: MEDICARE

## 2024-07-16 ENCOUNTER — APPOINTMENT (OUTPATIENT)
Dept: PHYSICAL THERAPY | Facility: HOSPITAL | Age: 74
End: 2024-07-16
Payer: MEDICARE

## 2024-07-18 ENCOUNTER — APPOINTMENT (OUTPATIENT)
Dept: PHYSICAL THERAPY | Facility: HOSPITAL | Age: 74
End: 2024-07-18
Payer: MEDICARE

## 2024-07-23 ENCOUNTER — APPOINTMENT (OUTPATIENT)
Dept: PHYSICAL THERAPY | Facility: HOSPITAL | Age: 74
End: 2024-07-23
Payer: MEDICARE

## 2024-07-24 ENCOUNTER — HOSPITAL ENCOUNTER (OUTPATIENT)
Dept: INFUSION THERAPY | Age: 74
Setting detail: INFUSION SERIES
Discharge: HOME | End: 2024-07-24
Payer: MEDICARE

## 2024-07-24 DIAGNOSIS — M81.0 POSTMENOPAUSAL OSTEOPOROSIS: Primary | ICD-10-CM

## 2024-07-24 PROCEDURE — 6360000002 HC RX W HCPCS: Performed by: FAMILY MEDICINE

## 2024-07-24 PROCEDURE — 96372 THER/PROPH/DIAG INJ SC/IM: CPT

## 2024-07-24 RX ADMIN — DENOSUMAB 60 MG: 60 INJECTION SUBCUTANEOUS at 09:59

## 2024-08-22 ENCOUNTER — APPOINTMENT (OUTPATIENT)
Dept: UROLOGY | Facility: CLINIC | Age: 74
End: 2024-08-22
Payer: MEDICARE

## 2024-09-12 ENCOUNTER — APPOINTMENT (OUTPATIENT)
Dept: UROLOGY | Facility: CLINIC | Age: 74
End: 2024-09-12
Payer: MEDICARE

## 2024-10-14 ENCOUNTER — APPOINTMENT (OUTPATIENT)
Dept: UROLOGY | Facility: CLINIC | Age: 74
End: 2024-10-14
Payer: MEDICARE

## 2024-10-14 VITALS
DIASTOLIC BLOOD PRESSURE: 80 MMHG | SYSTOLIC BLOOD PRESSURE: 152 MMHG | BODY MASS INDEX: 23.04 KG/M2 | WEIGHT: 130 LBS | HEIGHT: 63 IN | HEART RATE: 70 BPM

## 2024-10-14 DIAGNOSIS — R32 URINARY INCONTINENCE, UNSPECIFIED TYPE: ICD-10-CM

## 2024-10-14 DIAGNOSIS — N28.9 RENAL LESION: Primary | ICD-10-CM

## 2024-10-14 PROCEDURE — 99214 OFFICE O/P EST MOD 30 MIN: CPT | Performed by: UROLOGY

## 2024-10-14 PROCEDURE — 1159F MED LIST DOCD IN RCRD: CPT | Performed by: UROLOGY

## 2024-10-14 PROCEDURE — 1126F AMNT PAIN NOTED NONE PRSNT: CPT | Performed by: UROLOGY

## 2024-10-14 ASSESSMENT — PAIN SCALES - GENERAL: PAINLEVEL: 0-NO PAIN

## 2024-10-14 NOTE — PROGRESS NOTES
10/14/2024  Frequency and urinary incontinence, pad use 3 a day, does not bother.  Poor historian.    No blood in urine    CT urography not done    We discussed right renal lesion, renal ultrasound  We discussed urinary incontinence, conservative management, continue pad use  All the questions were answered, the patient expressed understanding and agreed to the plan.    Impression  Right renal lesionâ€“small  Urinary incontinence     Plan  Renal ultrasound for right renal lesion 1 year follow-up, call patient result  Conservative management for urinary incontinence, continue pad use  Follow-up as needed    Chief Complaint   Patient presents with    Follow-up     Pt is here for 1 year follow up Pt said she is have URINARY frequency         Physical Exam     TODAYS LAB RESULTS:      ASSESSMENT&PLAN:      IMPRESSIONS:      08/17/2023  73-year-old female presents an incidental finding of right renal lesion. Also urinary incontinence, using pads but does not bother     Patient has no nausea, no vomiting, no fever.     New patient here for right renal mass found on CT. Patient was at ED 06/28/2023 for right sided pain after she had a fall. Patient had right rib fractures 9-11. Patient was referred by Dr. Sarah Luciano, General Surgery.      Patient is experiencing increased urinary frequency. Patient claims she has experienced urinary leakage for a couple years. Patient denies nocturia. Patient denies dysuria.      CT Chest Abdomen Pelvis 06/28/2023  IMPRESSION:  CT CHEST/ABDOMEN/PELVIS:  1. Acute fractures of the posterior right 9th-11th ribs in 2  locations (costovertebral joint and posterolaterally) which meet  criteria for flail chest. There is also acute nondisplaced fracture  of the posterior right 12th rib.  2. No acute traumatic injury in the chest, abdomen, or pelvis.  3. Trace right pleural effusion and mild bibasilar atelectasis.  4. Mildly inflamed appearance of the distal transverse through the  rectosigmoid  colon as described above. Please correlate for symptoms  of colitis.  5. 0.7 cm incompletely characterized hyperdense right renal mass that  could represent an enhancing tumor or hemorrhagic cyst. Recommend  urologic follow-up for appropriate management which may include  multiphasic MRI of the kidneys or repeat cross-sectional imaging in 1  year to re-evaluate..     CT THORACIC AND LUMBAR SPINE:  No acute fracture or traumatic malalignment.  Degenerative changes of the thoracic lumbar spine, most severe at the  lumbosacral junction as detailed above.     We discussed right renal lesion, conservative management repeat CT scan in 1 year   we discussed the urinary incontinence, conservative management, continue pad using  All the questions were answered, the patient expressed understanding and agreed to the plan.     Impression  Right renal lesionâ€“small  Urinary incontinence     Plan  Conservative management for urinary incontinence  CT abdomen with and without contrast in 1 year for right renal lesion follow-up  Appointment in 1 year

## 2024-10-17 ENCOUNTER — HOSPITAL ENCOUNTER (OUTPATIENT)
Dept: RADIOLOGY | Facility: HOSPITAL | Age: 74
Discharge: HOME | End: 2024-10-17
Payer: MEDICARE

## 2024-10-17 DIAGNOSIS — R32 URINARY INCONTINENCE, UNSPECIFIED TYPE: ICD-10-CM

## 2024-10-17 DIAGNOSIS — N28.9 RENAL LESION: ICD-10-CM

## 2024-10-17 PROCEDURE — 76770 US EXAM ABDO BACK WALL COMP: CPT | Performed by: RADIOLOGY

## 2024-10-17 PROCEDURE — 76770 US EXAM ABDO BACK WALL COMP: CPT

## 2025-01-27 ENCOUNTER — HOSPITAL ENCOUNTER (OUTPATIENT)
Age: 75
Discharge: HOME | End: 2025-01-27
Payer: MEDICARE

## 2025-01-27 LAB
25(OH)D3 SERPL-MCNC: 43.8 NG/ML (ref 30–100)
ALBUMIN SERPL-MCNC: 4.1 G/DL (ref 3.5–5.2)
ALP SERPL-CCNC: 95 U/L (ref 35–104)
ALT SERPL-CCNC: 14 U/L (ref 0–32)
ANION GAP SERPL CALCULATED.3IONS-SCNC: 7 MMOL/L (ref 7–16)
AST SERPL-CCNC: 25 U/L (ref 0–31)
BILIRUB SERPL-MCNC: 0.3 MG/DL (ref 0–1.2)
BUN SERPL-MCNC: 16 MG/DL (ref 6–23)
CALCIUM SERPL-MCNC: 9.7 MG/DL (ref 8.6–10.2)
CHLORIDE SERPL-SCNC: 100 MMOL/L (ref 98–107)
CO2 SERPL-SCNC: 30 MMOL/L (ref 22–29)
CREAT SERPL-MCNC: 1.2 MG/DL (ref 0.5–1)
GFR, ESTIMATED: 48 ML/MIN/1.73M2
GLUCOSE SERPL-MCNC: 115 MG/DL (ref 74–99)
POTASSIUM SERPL-SCNC: 4.6 MMOL/L (ref 3.5–5)
PROT SERPL-MCNC: 7.4 G/DL (ref 6.4–8.3)
SODIUM SERPL-SCNC: 137 MMOL/L (ref 132–146)

## 2025-01-27 PROCEDURE — 80053 COMPREHEN METABOLIC PANEL: CPT

## 2025-01-27 PROCEDURE — 36415 COLL VENOUS BLD VENIPUNCTURE: CPT

## 2025-01-27 PROCEDURE — 82306 VITAMIN D 25 HYDROXY: CPT

## 2025-01-29 ENCOUNTER — HOSPITAL ENCOUNTER (OUTPATIENT)
Dept: INFUSION THERAPY | Age: 75
Setting detail: INFUSION SERIES
Discharge: HOME | End: 2025-01-29
Payer: MEDICARE

## 2025-01-29 VITALS
TEMPERATURE: 98.7 F | RESPIRATION RATE: 20 BRPM | OXYGEN SATURATION: 100 % | HEART RATE: 64 BPM | SYSTOLIC BLOOD PRESSURE: 147 MMHG | DIASTOLIC BLOOD PRESSURE: 68 MMHG

## 2025-01-29 DIAGNOSIS — M81.0 POSTMENOPAUSAL OSTEOPOROSIS: Primary | ICD-10-CM

## 2025-01-29 PROCEDURE — 6360000002 HC RX W HCPCS: Performed by: FAMILY MEDICINE

## 2025-01-29 PROCEDURE — 96372 THER/PROPH/DIAG INJ SC/IM: CPT

## 2025-01-29 RX ORDER — DONEPEZIL HYDROCHLORIDE 5 MG/1
10 TABLET, FILM COATED ORAL NIGHTLY
COMMUNITY

## 2025-01-29 RX ORDER — LAMOTRIGINE 150 MG/1
150 TABLET ORAL DAILY
COMMUNITY

## 2025-01-29 RX ORDER — MIRTAZAPINE 15 MG/1
7.5 TABLET, FILM COATED ORAL NIGHTLY
COMMUNITY

## 2025-01-29 RX ADMIN — DENOSUMAB 60 MG: 60 INJECTION SUBCUTANEOUS at 10:09

## 2025-09-03 ENCOUNTER — HOSPITAL ENCOUNTER (OUTPATIENT)
Dept: INFUSION THERAPY | Age: 75
Setting detail: INFUSION SERIES
Discharge: HOME | End: 2025-09-03
Payer: MEDICARE

## 2025-09-03 VITALS
TEMPERATURE: 97.5 F | SYSTOLIC BLOOD PRESSURE: 155 MMHG | HEART RATE: 66 BPM | RESPIRATION RATE: 20 BRPM | DIASTOLIC BLOOD PRESSURE: 69 MMHG | OXYGEN SATURATION: 100 %

## 2025-09-03 DIAGNOSIS — M81.0 SENILE OSTEOPOROSIS: Primary | ICD-10-CM

## 2025-09-03 PROCEDURE — 96372 THER/PROPH/DIAG INJ SC/IM: CPT

## 2025-09-03 PROCEDURE — 6360000002 HC RX W HCPCS: Performed by: FAMILY MEDICINE

## 2025-09-03 RX ADMIN — DENOSUMAB 60 MG: 60 INJECTION SUBCUTANEOUS at 11:54
